# Patient Record
Sex: FEMALE | Race: BLACK OR AFRICAN AMERICAN | NOT HISPANIC OR LATINO | Employment: UNEMPLOYED | URBAN - METROPOLITAN AREA
[De-identification: names, ages, dates, MRNs, and addresses within clinical notes are randomized per-mention and may not be internally consistent; named-entity substitution may affect disease eponyms.]

---

## 2020-03-20 ENCOUNTER — NURSE TRIAGE (OUTPATIENT)
Dept: ADMINISTRATIVE | Facility: CLINIC | Age: 35
End: 2020-03-20

## 2020-03-20 NOTE — TELEPHONE ENCOUNTER
Pt c/o sinus pressure, nasal drainage, and SOB. Pt denies fever. Pt has history of MS. 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 Pt advised to be seen per [facility] care. Pt notified of nearest testing location. Pt also notified to contact employee health regarding symptoms and pt verbalizes understanding.     Reason for Disposition   Health Information question, no triage required and triager able to answer question    Additional Information   Negative: RN needs further essential information from caller in order to complete triage   Negative: Requesting regular office appointment   Negative: [1] Caller requesting NON-URGENT health information AND [2] PCP's office is the best resource    Protocols used: INFORMATION ONLY CALL-A-

## 2020-04-21 DIAGNOSIS — Z01.84 ANTIBODY RESPONSE EXAMINATION: ICD-10-CM

## 2020-05-21 DIAGNOSIS — Z01.84 ANTIBODY RESPONSE EXAMINATION: ICD-10-CM

## 2020-06-20 DIAGNOSIS — Z01.84 ANTIBODY RESPONSE EXAMINATION: ICD-10-CM

## 2020-07-20 DIAGNOSIS — Z01.84 ANTIBODY RESPONSE EXAMINATION: ICD-10-CM

## 2020-08-19 DIAGNOSIS — Z01.84 ANTIBODY RESPONSE EXAMINATION: ICD-10-CM

## 2020-08-21 DIAGNOSIS — R52 GENERALIZED BODY ACHES: ICD-10-CM

## 2020-08-21 DIAGNOSIS — R09.81 HEAD CONGESTION: ICD-10-CM

## 2020-08-21 DIAGNOSIS — R50.9 FEVER, UNSPECIFIED FEVER CAUSE: Primary | ICD-10-CM

## 2020-09-18 ENCOUNTER — OFFICE VISIT (OUTPATIENT)
Dept: URGENT CARE | Facility: CLINIC | Age: 35
End: 2020-09-18
Payer: COMMERCIAL

## 2020-09-18 VITALS
HEART RATE: 101 BPM | TEMPERATURE: 99 F | OXYGEN SATURATION: 99 % | WEIGHT: 219 LBS | BODY MASS INDEX: 43 KG/M2 | RESPIRATION RATE: 16 BRPM | DIASTOLIC BLOOD PRESSURE: 89 MMHG | SYSTOLIC BLOOD PRESSURE: 133 MMHG | HEIGHT: 60 IN

## 2020-09-18 DIAGNOSIS — Z01.84 ANTIBODY RESPONSE EXAMINATION: ICD-10-CM

## 2020-09-18 DIAGNOSIS — S46.912A LEFT SHOULDER STRAIN, INITIAL ENCOUNTER: Primary | ICD-10-CM

## 2020-09-18 PROCEDURE — 99204 PR OFFICE/OUTPT VISIT, NEW, LEVL IV, 45-59 MIN: ICD-10-PCS | Mod: S$GLB,,, | Performed by: NURSE PRACTITIONER

## 2020-09-18 PROCEDURE — 99204 OFFICE O/P NEW MOD 45 MIN: CPT | Mod: S$GLB,,, | Performed by: NURSE PRACTITIONER

## 2020-09-18 RX ORDER — PREGABALIN 75 MG/1
75 CAPSULE ORAL 2 TIMES DAILY
COMMUNITY
End: 2021-10-24

## 2020-09-18 RX ORDER — ONDANSETRON HYDROCHLORIDE 8 MG/1
8 TABLET, FILM COATED ORAL
COMMUNITY

## 2020-09-18 RX ORDER — TOPIRAMATE 100 MG/1
100 TABLET, FILM COATED ORAL 2 TIMES DAILY
COMMUNITY
End: 2021-10-24

## 2020-09-18 RX ORDER — GABAPENTIN 300 MG/1
300 CAPSULE ORAL 3 TIMES DAILY
COMMUNITY
End: 2021-10-24

## 2020-09-18 RX ORDER — PREDNISONE 20 MG/1
20 TABLET ORAL 2 TIMES DAILY
Qty: 10 TABLET | Refills: 0 | Status: SHIPPED | OUTPATIENT
Start: 2020-09-18 | End: 2020-09-23

## 2020-09-18 RX ORDER — TRAMADOL HYDROCHLORIDE 50 MG/1
50 TABLET ORAL EVERY 6 HOURS
COMMUNITY

## 2020-09-18 RX ORDER — RENAGEL 400 MG/1
12 TABLET ORAL
COMMUNITY

## 2020-09-18 RX ORDER — ALPRAZOLAM 0.5 MG/1
0.5 TABLET ORAL 3 TIMES DAILY PRN
COMMUNITY

## 2020-09-18 RX ORDER — LEVOTHYROXINE SODIUM 112 UG/1
112 TABLET ORAL
COMMUNITY

## 2020-09-18 NOTE — PATIENT INSTRUCTIONS
Self-Care for Strains and Sprains  Most minor strains and sprains can be treated with self-care. Recovering from a strain or sprain may take 6 to 8 weeks. Your self-care goal is to reduce pain and immobilize the injury to speed healing.     A sprain injures ligaments (tissue that connects bones to bones).        A strain injures muscles or tendons (tissue that connects muscles to bones).   Support the injured area  Wrapping the injured area provides support for short, necessary activities. Be careful not to wrap the area too tightly. This could cut off the blood supply.  · Support a wrist, elbow, or shoulder with a sling.  · Wrap an ankle or knee with an elastic bandage.  · Tape a finger or toe to the one next to it.  Use cold and heat  Cold reduces swelling. Both cold and heat reduce pain. Heat should not be used in the initial treatment of the injury. When using cold or heat, always place a towel between the pack and your skin.  · Apply ice or a cold pack 10 to 15 minutes every hour youre awake for the first 2 days.  · After the swelling goes down, use cold or heat to control pain. Dont use heat late in the day, since it can cause swelling when youre not active.  Rest and elevate  Rest and elevation help your injury heal faster.  · Raise the injured area above your heart level.  · Keep the injured area from moving.  · Limit the use of the joint or limb.  Use medicine  · Aspirin reduces pain and swelling. (Note: Dont give aspirin to a child 18 or younger unless prescribed by the doctor.)  · Aspirin substitutes, such as ibuprofen, can reduce pain. Some substitutes reduce swelling, too. Ask your pharmacist which substitutes you can use.  Call your doctor if:  · The injured joint wont move, or bones make a grating sound when they move.  · You cant put weight on the injured area, even after 24 hours.  · The injured body part is cold, blue, or numb.  · The joint or limb appears bent or crooked.  · Pain increases  or doesnt improve in 4 days.  · When pressing along the injured area, you notice a spot that is especially painful.   Date Last Reviewed: 9/29/2015 © 2000-2017 [employer]. [address] [place] [address], [place] [address]. All rights reserved. This information is not intended as a substitute for professional medical care. Always follow your healthcare professional's instructions.        Shoulder Pain with Uncertain Cause  Shoulder pain can have many causes. Pain often comes from the structures that surround the shoulder joint. These are the joint capsule, ligaments, tendons, muscles, and bursa. Pain can also come from cartilage in the joint. Cartilage can become worn out or injured. Its important to know whats causing your pain so the healthcare provider can use the correct treatment. But sometimes its difficult to find the exact cause of shoulder pain. You may need to see a specialist (orthopedist). You may also need special tests such as a CT scan or MRI. The provider may need to use special tools to look inside the joint (arthroscopy).  Shoulder pain can be treated with a sling or a device that keeps your shoulder from moving. You can take an anti-inflammatory medicine such as ibuprofen to ease pain. You may need to do special shoulder exercises. Follow up with a specialist if the pain is severe or doesnt go away after a few weeks.  Home care  Follow these tips when caring for yourself at home:  · If a sling was given to you, leave it in place for the time advised by your healthcare provider. If you arent sure how long to wear it, ask for advice. If the sling becomes loose, adjust it so that your forearm is level with the ground. Your shoulder should feel well supported.  · Put an ice pack on the injured area for 20 minutes every 1 to 2 hours the first day. You can make your own ice pack by putting ice cubes in a plastic bag. Wrap the bag in a thin towel. Continue with ice packs 3 to 4 times a day  for the next 2 days. Then use the pack as needed to ease pain and swelling.  · You may use acetaminophen or ibuprofen to control pain, unless another pain medicine was prescribed. If you have chronic liver or kidney disease, talk with your healthcare provider before using these medicines. Also talk with your provider if youve ever had a stomach ulcer or GI bleeding.  · Shoulder pain may seem worse at night, when there is less to distract you from the pain. If you sleep on your side, try to keep weight off your painful shoulder. Propping pillows behind you may stop you from rolling over onto that shoulder during sleep.   · Shoulder and elbow joints can become stiff if left in a sling for too long. You should start range of motion exercises about 7 to 10 days after the injury. Talk with your provider to find out what type of exercises to do and how soon to start.  · You can take the sling off to shower or bathe.  Follow-up care  Follow up with your healthcare provider if you dont start to get better in the next 5 days.  When to seek medical advice  Call your healthcare provider right away if any of these occur:  · Pain or swelling gets worse or continues for more than a few days  · Your hand or fingers become cold, blue, numb, or tingly  · Large amount of bruising on your shoulder or upper arm  · Difficulty moving your hand or fingers  · Weakness in your hand or fingers  · Your shoulder becomes stiff  · It feels like your shoulder is popping out  · You are less able to do your daily activities  Date Last Reviewed: 10/1/2016  © 7077-4433 [employer] [address] [place] [address], [place] [address]. All rights reserved. This information is not intended as a substitute for professional medical care. Always follow your healthcare professional's instructions.

## 2020-09-18 NOTE — PROGRESS NOTES
Subjective:       Patient ID: Sully Simmons is a 35 y.o. female.    Chief Complaint: Work Related Injury (Upper Back Pain )    Presents with left subscapular pain. 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 Felt a pulling sensation to left back on 9/4/20, went to an urgent care on 9/7/20, had xrays (negative), given naproxen and robaxin. States took naproxen with no relief. Has been working since then, feels like it is getting worse, or at least not improving    Back Pain  This is a new problem. The current episode started 1 to 4 weeks ago. The problem occurs 2 to 4 times per day. The problem has been gradually worsening since onset. The pain is present in the thoracic spine. The quality of the pain is described as aching and shooting. Pertinent negatives include no abdominal pain, bladder incontinence, bowel incontinence, dysuria or numbness.       Constitution: Negative for fatigue.   Gastrointestinal: Negative for abdominal pain and bowel incontinence.   Genitourinary: Negative for dysuria, urgency, bladder incontinence and hematuria.   Musculoskeletal: Positive for pain, trauma (heavy lifting and turning of [facility] patients) and back pain. Negative for muscle cramps and history of spine disorder.   Skin: Negative for rash.   Neurological: Negative for coordination disturbances, numbness and tingling.        Objective:      Physical Exam  Constitutional:       Appearance: Normal appearance. She is well-developed.   HENT:      Head: Normocephalic.      Right Ear: Hearing, tympanic membrane, ear canal and external ear normal.      Left Ear: Hearing, tympanic membrane, ear canal and external ear normal.      Nose: Nose normal.      Mouth/Throat:      Pharynx: Uvula midline.   Eyes:      General: Lids are normal.      Conjunctiva/sclera: Conjunctivae normal.      Pupils: Pupils are equal, round, and reactive to light.   Neck:      Musculoskeletal: Full passive range of motion  without pain, normal range of motion and neck supple.      Trachea: Trachea and phonation normal.   Cardiovascular:      Rate and Rhythm: Normal rate and regular rhythm.      Pulses: Normal pulses.      Heart sounds: Normal heart sounds.   Pulmonary:      Effort: Pulmonary effort is normal.      Breath sounds: Normal breath sounds.   Abdominal:      General: Bowel sounds are normal.      Palpations: Abdomen is soft.      Tenderness: There is no abdominal tenderness.   Musculoskeletal: Normal range of motion.      Thoracic back: She exhibits tenderness, pain and spasm.        Back:         Arms:    Skin:     General: Skin is warm and dry.      Capillary Refill: Capillary refill takes less than 2 seconds.   Neurological:      Mental Status: She is alert and oriented to person, place, and time.      GCS: GCS eye subscore is 4. GCS verbal subscore is 5. GCS motor subscore is 6.   Psychiatric:         Speech: Speech normal.         Behavior: Behavior normal. Behavior is cooperative.         Thought Content: Thought content normal.         Judgment: Judgment normal.         Assessment:       1. Left shoulder strain, initial encounter        Plan:     recommended restrictions at work, to take robaxin that she has at home along with prednisone. Will follow up in one week for recheck, will decide then if needs to go to ortho    Medications Ordered This Encounter   Medications    predniSONE (DELTASONE) 20 MG tablet     Sig: Take 1 tablet (20 mg total) by mouth 2 (two) times daily. for 5 days     Dispense:  10 tablet     Refill:  0            Follow up in 1 week (on 9/25/2020).

## 2020-09-22 ENCOUNTER — TELEPHONE (OUTPATIENT)
Dept: URGENT CARE | Facility: CLINIC | Age: 35
End: 2020-09-22

## 2020-09-25 ENCOUNTER — OCCUPATIONAL HEALTH (OUTPATIENT)
Dept: URGENT CARE | Facility: CLINIC | Age: 35
End: 2020-09-25
Payer: COMMERCIAL

## 2020-09-25 VITALS
OXYGEN SATURATION: 98 % | HEART RATE: 86 BPM | DIASTOLIC BLOOD PRESSURE: 90 MMHG | WEIGHT: 259 LBS | TEMPERATURE: 99 F | SYSTOLIC BLOOD PRESSURE: 124 MMHG | RESPIRATION RATE: 12 BRPM | BODY MASS INDEX: 50.58 KG/M2

## 2020-09-25 DIAGNOSIS — S46.912D LEFT SHOULDER STRAIN, SUBSEQUENT ENCOUNTER: Primary | ICD-10-CM

## 2020-09-25 PROCEDURE — 99214 PR OFFICE/OUTPT VISIT, EST, LEVL IV, 30-39 MIN: ICD-10-PCS | Mod: S$GLB,,, | Performed by: NURSE PRACTITIONER

## 2020-09-25 PROCEDURE — 99214 OFFICE O/P EST MOD 30 MIN: CPT | Mod: S$GLB,,, | Performed by: NURSE PRACTITIONER

## 2020-09-25 RX ORDER — HYDROCODONE BITARTRATE AND ACETAMINOPHEN 5; 325 MG/1; MG/1
1 TABLET ORAL EVERY 6 HOURS PRN
Qty: 12 TABLET | Refills: 0 | Status: SHIPPED | OUTPATIENT
Start: 2020-09-25 | End: 2021-10-24

## 2020-09-25 RX ORDER — LIDOCAINE 50 MG/G
1 PATCH TOPICAL DAILY
Qty: 30 PATCH | Refills: 0 | Status: SHIPPED | OUTPATIENT
Start: 2020-09-25

## 2020-09-25 NOTE — PROGRESS NOTES
Subjective:       Patient ID: Sully Simmons is a 35 y.o. female.    Vitals:  weight is 117.5 kg (259 lb). Her temperature is 98.7 °F (37.1 °C). Her blood pressure is 124/90 (abnormal) and her pulse is 86. Her respiration is 12 and oxygen saturation is 98%.     Chief Complaint: Work Related Injury    W/C DOI: 9/18/2020 Left shoulder strain   Does not feel she is improving. States she has taken medications as prescribed and following restrictions, states she does not notice much difference in pain from original visit. Pain occurs all the time, with standing, sitting and moving    Shoulder Pain   This is a new problem. Episode onset: 9/18/2020  The problem occurs constantly. The problem has been gradually worsening. The pain is at a severity of 5/10. Pertinent negatives include no fever or headaches. Associated symptoms comments: All day stiffness, left shoulder pain that radiates upward   ( throbbing and achy )   . Treatments tried: tramodol and steriods  The treatment provided no relief.       Constitution: Negative for appetite change, chills and fever.   HENT: Negative for ear pain, congestion and sore throat.    Neck: Negative for painful lymph nodes.   Eyes: Negative for eye discharge and eye redness.   Respiratory: Negative for cough.    Gastrointestinal: Negative for vomiting and diarrhea.   Genitourinary: Negative for dysuria.   Musculoskeletal: Positive for pain, joint pain and muscle ache.   Skin: Negative for rash.   Neurological: Negative for headaches and seizures.   Hematologic/Lymphatic: Negative for swollen lymph nodes.       Objective:      Physical Exam   Constitutional: She is oriented to person, place, and time. She appears well-developed.   HENT:   Head: Normocephalic and atraumatic.   Nose: Nose normal.   Mouth/Throat: Oropharynx is clear and moist.   Eyes: Pupils are equal, round, and reactive to light. Conjunctivae and EOM are normal.   Neck: Normal range of motion. Neck supple.    Cardiovascular: Normal rate, regular rhythm and normal heart sounds.   Pulmonary/Chest: Effort normal and breath sounds normal.   Abdominal: Soft.   Musculoskeletal:      Left shoulder: She exhibits decreased range of motion, tenderness, pain and spasm.   Neurological: She is alert and oriented to person, place, and time.   Skin: Skin is warm and dry. Capillary refill takes less than 2 seconds. Psychiatric: Her behavior is normal.         Assessment:       1. Left shoulder strain, subsequent encounter        Plan:     will refer patient to ortho as she continues to have deep posterior shoulder pain with rest and with movement. Will try lidoderm patches, norco for pain with restrictions at work    Left shoulder strain, subsequent encounter    Other orders  -     lidocaine (LIDODERM) 5 %; Place 1 patch onto the skin once daily. Remove & Discard patch within 12 hours or as directed by MD  Dispense: 30 patch; Refill: 0  -     HYDROcodone-acetaminophen (NORCO) 5-325 mg per tablet; Take 1 tablet by mouth every 6 (six) hours as needed for Pain.  Dispense: 12 tablet; Refill: 0

## 2020-09-28 ENCOUNTER — TELEPHONE (OUTPATIENT)
Dept: URGENT CARE | Facility: CLINIC | Age: 35
End: 2020-09-28

## 2020-09-28 NOTE — TELEPHONE ENCOUNTER
Pt called to see if we could edit her work status and put her out of work bc her shoulder is not doing any better. Informed that since there's no obvious fracture, we cannot do that. She will keep her current restrictions and f/u with ortho as referred. They will update restrictions as needed. Pt provided verbal understanding.

## 2020-10-18 DIAGNOSIS — Z01.84 ANTIBODY RESPONSE EXAMINATION: ICD-10-CM

## 2020-11-13 ENCOUNTER — OFFICE VISIT (OUTPATIENT)
Dept: URGENT CARE | Facility: CLINIC | Age: 35
End: 2020-11-13
Payer: COMMERCIAL

## 2020-11-13 VITALS
TEMPERATURE: 98 F | HEART RATE: 95 BPM | BODY MASS INDEX: 41.91 KG/M2 | WEIGHT: 222 LBS | OXYGEN SATURATION: 97 % | HEIGHT: 61 IN | DIASTOLIC BLOOD PRESSURE: 99 MMHG | SYSTOLIC BLOOD PRESSURE: 138 MMHG

## 2020-11-13 DIAGNOSIS — M25.512 ACUTE PAIN OF LEFT SHOULDER: Primary | ICD-10-CM

## 2020-11-13 PROCEDURE — 99214 OFFICE O/P EST MOD 30 MIN: CPT | Mod: S$GLB,,, | Performed by: NURSE PRACTITIONER

## 2020-11-13 PROCEDURE — 99214 PR OFFICE/OUTPT VISIT, EST, LEVL IV, 30-39 MIN: ICD-10-PCS | Mod: S$GLB,,, | Performed by: NURSE PRACTITIONER

## 2020-11-13 RX ORDER — HYDROCODONE BITARTRATE AND ACETAMINOPHEN 5; 325 MG/1; MG/1
1 TABLET ORAL EVERY 8 HOURS PRN
Qty: 6 TABLET | Refills: 0 | Status: SHIPPED | OUTPATIENT
Start: 2020-11-13 | End: 2021-10-24

## 2020-11-13 NOTE — PROGRESS NOTES
"Subjective:       Patient ID: Sully Simmons is a 35 y.o. female.    Vitals:  height is 5' 1" (1.549 m) and weight is 100.7 kg (222 lb). Her oral temperature is 98.1 °F (36.7 °C). Her blood pressure is 138/99 (abnormal) and her pulse is 95. Her oxygen saturation is 97%.     Chief Complaint: Work Related Injury    Patient complains of pain in her left shoulder. Says she has an appt with ortho surgeon Monday. 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 Says it is very painful. Pain is underneath her shoulder blade, & now it is extending up to her neck. Reports she cant take NSAID or Naproxen. Said she has also tried Tylenol/Tramadol but no relief.  Patient requesting narcotics and sleeping medication.       Constitution: Negative for chills, fatigue and fever.   HENT: Negative for congestion and sore throat.    Neck: Negative for painful lymph nodes.   Cardiovascular: Negative for chest pain and leg swelling.   Eyes: Negative for double vision and blurred vision.   Respiratory: Negative for cough and shortness of breath.    Gastrointestinal: Negative for abdominal pain, nausea, vomiting and diarrhea.   Genitourinary: Negative for dysuria, frequency, urgency and history of kidney stones.   Musculoskeletal: Negative for joint pain, joint swelling, muscle cramps and muscle ache.        Left shoulder pain   Skin: Negative for color change, pale, rash and bruising.   Allergic/Immunologic: Negative for seasonal allergies.   Neurological: Negative for dizziness, history of vertigo, light-headedness, passing out and headaches.   Hematologic/Lymphatic: Negative for swollen lymph nodes.   Psychiatric/Behavioral: Negative for nervous/anxious, sleep disturbance and depression. The patient is not nervous/anxious.        Objective:      Physical Exam   Constitutional: She is oriented to person, place, and time. She appears well-developed. She is cooperative.  " Non-toxic appearance. She does not appear ill. No distress.   HENT:   Head: Normocephalic and atraumatic.   Ears:   Right Ear: Hearing, tympanic membrane, external ear and ear canal normal.   Left Ear: Hearing, tympanic membrane, external ear and ear canal normal.   Nose: Nose normal. No mucosal edema, rhinorrhea or nasal deformity. No epistaxis. Right sinus exhibits no maxillary sinus tenderness and no frontal sinus tenderness. Left sinus exhibits no maxillary sinus tenderness and no frontal sinus tenderness.   Mouth/Throat: Uvula is midline, oropharynx is clear and moist and mucous membranes are normal. No trismus in the jaw. Normal dentition. No uvula swelling. No posterior oropharyngeal erythema.   Eyes: Conjunctivae and lids are normal. Right eye exhibits no discharge. Left eye exhibits no discharge. No scleral icterus.   Neck: Trachea normal, normal range of motion, full passive range of motion without pain and phonation normal. Neck supple.   Cardiovascular: Normal rate, regular rhythm, normal heart sounds and normal pulses.   Pulmonary/Chest: Effort normal and breath sounds normal. No respiratory distress.   Abdominal: Soft. Normal appearance and bowel sounds are normal. She exhibits no distension, no pulsatile midline mass and no mass. There is no abdominal tenderness.   Musculoskeletal: Normal range of motion.         General: No deformity.      Left shoulder: She exhibits tenderness, pain and decreased strength. She exhibits normal range of motion, no bony tenderness, no swelling, no effusion, no crepitus, no deformity, no laceration, no spasm and normal pulse.   Neurological: She is alert and oriented to person, place, and time. She exhibits normal muscle tone. Coordination normal. GCS eye subscore is 4. GCS verbal subscore is 5. GCS motor subscore is 6.   Skin: Skin is warm, dry, intact, not diaphoretic and not pale. Psychiatric: Her speech is normal and behavior is normal. Judgment and thought content  normal.   Nursing note and vitals reviewed.        Assessment:       1. Acute pain of left shoulder        Plan:       Advised to follow up with orthopedic as scheduled on Monday.     Acute pain of left shoulder    Other orders  -     HYDROcodone-acetaminophen (NORCO) 5-325 mg per tablet; Take 1 tablet by mouth every 8 (eight) hours as needed for Pain.  Dispense: 6 tablet; Refill: 0

## 2020-11-13 NOTE — PATIENT INSTRUCTIONS
Shoulder Pain with Uncertain Cause  Shoulder pain can have many causes. Pain often comes from the structures that surround the shoulder joint. These are the joint capsule, ligaments, tendons, muscles, and bursa. Pain can also come from cartilage in the joint. Cartilage can become worn out or injured. Its important to know whats causing your pain so the healthcare provider can use the correct treatment. But sometimes its difficult to find the exact cause of shoulder pain. You may need to see a specialist (orthopedist). You may also need special tests such as a CT scan or MRI. The provider may need to use special tools to look inside the joint (arthroscopy).  Shoulder pain can be treated with a sling or a device that keeps your shoulder from moving. You can take an anti-inflammatory medicine such as ibuprofen to ease pain. You may need to do special shoulder exercises. Follow up with a specialist if the pain is severe or doesnt go away after a few weeks.  Home care  Follow these tips when caring for yourself at home:  · If a sling was given to you, leave it in place for the time advised by your healthcare provider. If you arent sure how long to wear it, ask for advice. If the sling becomes loose, adjust it so that your forearm is level with the ground. Your shoulder should feel well supported.  · Put an ice pack on the injured area for 20 minutes every 1 to 2 hours the first day. You can make your own ice pack by putting ice cubes in a plastic bag. Wrap the bag in a thin towel. Continue with ice packs 3 to 4 times a day for the next 2 days. Then use the pack as needed to ease pain and swelling.  · You may use acetaminophen or ibuprofen to control pain, unless another pain medicine was prescribed. If you have chronic liver or kidney disease, talk with your healthcare provider before using these medicines. Also talk with your provider if youve ever had a stomach ulcer or GI bleeding.  · Shoulder pain may seem  worse at night, when there is less to distract you from the pain. If you sleep on your side, try to keep weight off your painful shoulder. Propping pillows behind you may stop you from rolling over onto that shoulder during sleep.   · Shoulder and elbow joints can become stiff if left in a sling for too long. You should start range of motion exercises about 7 to 10 days after the injury. Talk with your provider to find out what type of exercises to do and how soon to start.  · You can take the sling off to shower or bathe.  Follow-up care  Follow up with your healthcare provider if you dont start to get better in the next 5 days.  When to seek medical advice  Call your healthcare provider right away if any of these occur:  · Pain or swelling gets worse or continues for more than a few days  · Your hand or fingers become cold, blue, numb, or tingly  · Large amount of bruising on your shoulder or upper arm  · Difficulty moving your hand or fingers  · Weakness in your hand or fingers  · Your shoulder becomes stiff  · It feels like your shoulder is popping out  · You are less able to do your daily activities  Date Last Reviewed: 10/1/2016  © 3539-3808 [employer]. [address] [place] [address], [place] [address]. All rights reserved. This information is not intended as a substitute for professional medical care. Always follow your healthcare professional's instructions.

## 2020-11-17 DIAGNOSIS — Z01.84 ANTIBODY RESPONSE EXAMINATION: ICD-10-CM

## 2021-02-10 ENCOUNTER — HOSPITAL ENCOUNTER (EMERGENCY)
Facility: HOSPITAL | Age: 36
Discharge: HOME | End: 2021-02-10
Attending: EMERGENCY MEDICINE
Payer: MEDICAID

## 2021-02-10 VITALS
WEIGHT: 225 LBS | HEIGHT: 57 IN | HEART RATE: 91 BPM | OXYGEN SATURATION: 100 % | TEMPERATURE: 98 F | RESPIRATION RATE: 18 BRPM | DIASTOLIC BLOOD PRESSURE: 57 MMHG | SYSTOLIC BLOOD PRESSURE: 101 MMHG | BODY MASS INDEX: 48.54 KG/M2

## 2021-02-10 DIAGNOSIS — N39.0 URINARY TRACT INFECTION WITHOUT HEMATURIA, SITE UNSPECIFIED: ICD-10-CM

## 2021-02-10 DIAGNOSIS — R00.2 PALPITATIONS: Primary | ICD-10-CM

## 2021-02-10 LAB
ALBUMIN SERPL BCP-MCNC: 3.4 G/DL (ref 3.5–5.2)
ALP SERPL-CCNC: 88 U/L (ref 55–135)
ALT SERPL W/O P-5'-P-CCNC: 15 U/L (ref 10–44)
ANION GAP SERPL CALC-SCNC: 10 MMOL/L (ref 8–16)
AST SERPL-CCNC: 14 U/L (ref 10–40)
BACTERIA #/AREA URNS HPF: ABNORMAL /HPF
BASOPHILS # BLD AUTO: 0.06 K/UL (ref 0–0.2)
BASOPHILS NFR BLD: 0.5 % (ref 0–1.9)
BILIRUB SERPL-MCNC: 0.2 MG/DL (ref 0.1–1)
BILIRUB UR QL STRIP: NEGATIVE
BUN SERPL-MCNC: 7 MG/DL (ref 6–20)
CALCIUM SERPL-MCNC: 9.7 MG/DL (ref 8.7–10.5)
CHLORIDE SERPL-SCNC: 104 MMOL/L (ref 95–110)
CLARITY UR: ABNORMAL
CO2 SERPL-SCNC: 24 MMOL/L (ref 23–29)
COLOR UR: YELLOW
CREAT SERPL-MCNC: 0.7 MG/DL (ref 0.5–1.4)
DIFFERENTIAL METHOD: ABNORMAL
EOSINOPHIL # BLD AUTO: 0.2 K/UL (ref 0–0.5)
EOSINOPHIL NFR BLD: 1.6 % (ref 0–8)
ERYTHROCYTE [DISTWIDTH] IN BLOOD BY AUTOMATED COUNT: 13.1 % (ref 11.5–14.5)
EST. GFR  (AFRICAN AMERICAN): >60 ML/MIN/1.73 M^2
EST. GFR  (NON AFRICAN AMERICAN): >60 ML/MIN/1.73 M^2
GLUCOSE SERPL-MCNC: 80 MG/DL (ref 70–110)
GLUCOSE UR QL STRIP: NEGATIVE
HCT VFR BLD AUTO: 37.2 % (ref 37–48.5)
HGB BLD-MCNC: 11.7 G/DL (ref 12–16)
HGB UR QL STRIP: NEGATIVE
IMM GRANULOCYTES # BLD AUTO: 0.04 K/UL (ref 0–0.04)
IMM GRANULOCYTES NFR BLD AUTO: 0.3 % (ref 0–0.5)
KETONES UR QL STRIP: NEGATIVE
LEUKOCYTE ESTERASE UR QL STRIP: ABNORMAL
LYMPHOCYTES # BLD AUTO: 2.3 K/UL (ref 1–4.8)
LYMPHOCYTES NFR BLD: 17.6 % (ref 18–48)
MAGNESIUM SERPL-MCNC: 1.7 MG/DL (ref 1.6–2.6)
MCH RBC QN AUTO: 27.3 PG (ref 27–31)
MCHC RBC AUTO-ENTMCNC: 31.5 G/DL (ref 32–36)
MCV RBC AUTO: 87 FL (ref 82–98)
MICROSCOPIC COMMENT: ABNORMAL
MONOCYTES # BLD AUTO: 1.1 K/UL (ref 0.3–1)
MONOCYTES NFR BLD: 8.2 % (ref 4–15)
NEUTROPHILS # BLD AUTO: 9.2 K/UL (ref 1.8–7.7)
NEUTROPHILS NFR BLD: 71.8 % (ref 38–73)
NITRITE UR QL STRIP: NEGATIVE
NRBC BLD-RTO: 0 /100 WBC
PH UR STRIP: 7 [PH] (ref 5–8)
PLATELET # BLD AUTO: 305 K/UL (ref 150–350)
PMV BLD AUTO: 9.9 FL (ref 9.2–12.9)
POCT GLUCOSE: 81 MG/DL (ref 70–110)
POTASSIUM SERPL-SCNC: 3.7 MMOL/L (ref 3.5–5.1)
PROT SERPL-MCNC: 7.5 G/DL (ref 6–8.4)
PROT UR QL STRIP: NEGATIVE
RBC # BLD AUTO: 4.28 M/UL (ref 4–5.4)
SODIUM SERPL-SCNC: 138 MMOL/L (ref 136–145)
SP GR UR STRIP: 1.01 (ref 1–1.03)
SQUAMOUS #/AREA URNS HPF: 15 /HPF
TSH SERPL DL<=0.005 MIU/L-ACNC: 3.4 UIU/ML (ref 0.4–4)
URN SPEC COLLECT METH UR: ABNORMAL
UROBILINOGEN UR STRIP-ACNC: NEGATIVE EU/DL
WBC # BLD AUTO: 12.75 K/UL (ref 3.9–12.7)
WBC #/AREA URNS HPF: 25 /HPF (ref 0–5)

## 2021-02-10 PROCEDURE — 80053 COMPREHEN METABOLIC PANEL: CPT

## 2021-02-10 PROCEDURE — 93005 ELECTROCARDIOGRAM TRACING: CPT

## 2021-02-10 PROCEDURE — 93010 ELECTROCARDIOGRAM REPORT: CPT | Mod: ,,, | Performed by: INTERNAL MEDICINE

## 2021-02-10 PROCEDURE — 99284 EMERGENCY DEPT VISIT MOD MDM: CPT | Mod: 25

## 2021-02-10 PROCEDURE — 81000 URINALYSIS NONAUTO W/SCOPE: CPT

## 2021-02-10 PROCEDURE — 84443 ASSAY THYROID STIM HORMONE: CPT

## 2021-02-10 PROCEDURE — 85025 COMPLETE CBC W/AUTO DIFF WBC: CPT

## 2021-02-10 PROCEDURE — 36415 COLL VENOUS BLD VENIPUNCTURE: CPT

## 2021-02-10 PROCEDURE — 83735 ASSAY OF MAGNESIUM: CPT

## 2021-02-10 PROCEDURE — 82962 GLUCOSE BLOOD TEST: CPT

## 2021-02-10 PROCEDURE — 87086 URINE CULTURE/COLONY COUNT: CPT

## 2021-02-10 PROCEDURE — 93010 EKG 12-LEAD: ICD-10-PCS | Mod: ,,, | Performed by: INTERNAL MEDICINE

## 2021-02-10 RX ORDER — NITROFURANTOIN 25; 75 MG/1; MG/1
100 CAPSULE ORAL 2 TIMES DAILY
Qty: 10 CAPSULE | Refills: 0 | Status: SHIPPED | OUTPATIENT
Start: 2021-02-10 | End: 2021-02-15

## 2021-02-11 LAB
BACTERIA UR CULT: NORMAL
BACTERIA UR CULT: NORMAL

## 2021-10-24 ENCOUNTER — HOSPITAL ENCOUNTER (INPATIENT)
Facility: HOSPITAL | Age: 36
LOS: 1 days | Discharge: HOME | DRG: 177 | End: 2021-10-25
Attending: EMERGENCY MEDICINE | Admitting: INTERNAL MEDICINE
Payer: MEDICAID

## 2021-10-24 DIAGNOSIS — U07.1 PNEUMONIA DUE TO COVID-19 VIRUS: Primary | ICD-10-CM

## 2021-10-24 DIAGNOSIS — J96.01 ACUTE HYPOXEMIC RESPIRATORY FAILURE: ICD-10-CM

## 2021-10-24 DIAGNOSIS — Z20.822 SUSPECTED COVID-19 VIRUS INFECTION: ICD-10-CM

## 2021-10-24 DIAGNOSIS — J12.82 PNEUMONIA DUE TO COVID-19 VIRUS: Primary | ICD-10-CM

## 2021-10-24 DIAGNOSIS — R06.02 SOB (SHORTNESS OF BREATH): ICD-10-CM

## 2021-10-24 PROBLEM — G35 MS (MULTIPLE SCLEROSIS): Status: ACTIVE | Noted: 2021-10-24

## 2021-10-24 PROBLEM — D50.9 HYPOCHROMIC MICROCYTIC ANEMIA: Status: ACTIVE | Noted: 2021-10-24

## 2021-10-24 PROBLEM — E66.01 SEVERE OBESITY (BMI >= 40): Status: ACTIVE | Noted: 2021-10-24

## 2021-10-24 PROBLEM — E06.3 HASHIMOTO'S THYROIDITIS: Status: ACTIVE | Noted: 2021-10-24

## 2021-10-24 LAB
ALBUMIN SERPL BCP-MCNC: 3.6 G/DL (ref 3.5–5.2)
ALP SERPL-CCNC: 83 U/L (ref 55–135)
ALT SERPL W/O P-5'-P-CCNC: 20 U/L (ref 10–44)
ANION GAP SERPL CALC-SCNC: 9 MMOL/L (ref 8–16)
AST SERPL-CCNC: 33 U/L (ref 10–40)
BASOPHILS # BLD AUTO: 0.02 K/UL (ref 0–0.2)
BASOPHILS NFR BLD: 0.4 % (ref 0–1.9)
BILIRUB SERPL-MCNC: 0.6 MG/DL (ref 0.1–1)
BNP SERPL-MCNC: 29 PG/ML (ref 0–99)
BUN SERPL-MCNC: 5 MG/DL (ref 6–20)
CALCIUM SERPL-MCNC: 8.3 MG/DL (ref 8.7–10.5)
CHLORIDE SERPL-SCNC: 99 MMOL/L (ref 95–110)
CK SERPL-CCNC: 118 U/L (ref 20–180)
CO2 SERPL-SCNC: 28 MMOL/L (ref 23–29)
CREAT SERPL-MCNC: 0.8 MG/DL (ref 0.5–1.4)
CRP SERPL-MCNC: 5.46 MG/DL
D DIMER PPP IA.FEU-MCNC: 0.63 UG/ML FEU
DIFFERENTIAL METHOD: ABNORMAL
EOSINOPHIL # BLD AUTO: 0.1 K/UL (ref 0–0.5)
EOSINOPHIL NFR BLD: 1.7 % (ref 0–8)
ERYTHROCYTE [DISTWIDTH] IN BLOOD BY AUTOMATED COUNT: 13.6 % (ref 11.5–14.5)
EST. GFR  (AFRICAN AMERICAN): >60 ML/MIN/1.73 M^2
EST. GFR  (NON AFRICAN AMERICAN): >60 ML/MIN/1.73 M^2
FERRITIN SERPL-MCNC: 53 NG/ML (ref 20–300)
GLUCOSE SERPL-MCNC: 160 MG/DL (ref 70–110)
GLUCOSE SERPL-MCNC: 96 MG/DL (ref 70–110)
HCT VFR BLD AUTO: 37.4 % (ref 37–48.5)
HGB BLD-MCNC: 11.6 G/DL (ref 12–16)
IMM GRANULOCYTES # BLD AUTO: 0.01 K/UL (ref 0–0.04)
IMM GRANULOCYTES NFR BLD AUTO: 0.2 % (ref 0–0.5)
LACTATE SERPL-SCNC: 0.6 MMOL/L (ref 0.5–1.9)
LDH SERPL L TO P-CCNC: 267 U/L (ref 110–260)
LYMPHOCYTES # BLD AUTO: 1.3 K/UL (ref 1–4.8)
LYMPHOCYTES NFR BLD: 24.2 % (ref 18–48)
MCH RBC QN AUTO: 25.2 PG (ref 27–31)
MCHC RBC AUTO-ENTMCNC: 31 G/DL (ref 32–36)
MCV RBC AUTO: 81 FL (ref 82–98)
MONOCYTES # BLD AUTO: 0.4 K/UL (ref 0.3–1)
MONOCYTES NFR BLD: 7.8 % (ref 4–15)
NEUTROPHILS # BLD AUTO: 3.5 K/UL (ref 1.8–7.7)
NEUTROPHILS NFR BLD: 65.7 % (ref 38–73)
NRBC BLD-RTO: 0 /100 WBC
PLATELET # BLD AUTO: 254 K/UL (ref 150–450)
PMV BLD AUTO: 10.8 FL (ref 9.2–12.9)
POTASSIUM SERPL-SCNC: 3.9 MMOL/L (ref 3.5–5.1)
PROCALCITONIN SERPL IA-MCNC: <0.05 NG/ML (ref 0–0.5)
PROT SERPL-MCNC: 7.4 G/DL (ref 6–8.4)
RBC # BLD AUTO: 4.61 M/UL (ref 4–5.4)
SARS-COV-2 RDRP RESP QL NAA+PROBE: POSITIVE
SODIUM SERPL-SCNC: 136 MMOL/L (ref 136–145)
TROPONIN I SERPL DL<=0.01 NG/ML-MCNC: <0.03 NG/ML
WBC # BLD AUTO: 5.28 K/UL (ref 3.9–12.7)

## 2021-10-24 PROCEDURE — 21400001 HC TELEMETRY ROOM

## 2021-10-24 PROCEDURE — 93010 EKG 12-LEAD: ICD-10-PCS | Mod: ,,, | Performed by: INTERNAL MEDICINE

## 2021-10-24 PROCEDURE — U0002 COVID-19 LAB TEST NON-CDC: HCPCS | Performed by: EMERGENCY MEDICINE

## 2021-10-24 PROCEDURE — 99900035 HC TECH TIME PER 15 MIN (STAT)

## 2021-10-24 PROCEDURE — 93005 ELECTROCARDIOGRAM TRACING: CPT | Performed by: INTERNAL MEDICINE

## 2021-10-24 PROCEDURE — 99291 CRITICAL CARE FIRST HOUR: CPT

## 2021-10-24 PROCEDURE — 63600175 PHARM REV CODE 636 W HCPCS: Performed by: NURSE PRACTITIONER

## 2021-10-24 PROCEDURE — 82550 ASSAY OF CK (CPK): CPT | Performed by: EMERGENCY MEDICINE

## 2021-10-24 PROCEDURE — 25000003 PHARM REV CODE 250: Performed by: NURSE PRACTITIONER

## 2021-10-24 PROCEDURE — 85379 FIBRIN DEGRADATION QUANT: CPT | Performed by: NURSE PRACTITIONER

## 2021-10-24 PROCEDURE — 80053 COMPREHEN METABOLIC PANEL: CPT | Performed by: EMERGENCY MEDICINE

## 2021-10-24 PROCEDURE — 83605 ASSAY OF LACTIC ACID: CPT | Performed by: EMERGENCY MEDICINE

## 2021-10-24 PROCEDURE — 82728 ASSAY OF FERRITIN: CPT | Performed by: EMERGENCY MEDICINE

## 2021-10-24 PROCEDURE — 85025 COMPLETE CBC W/AUTO DIFF WBC: CPT | Performed by: EMERGENCY MEDICINE

## 2021-10-24 PROCEDURE — 83880 ASSAY OF NATRIURETIC PEPTIDE: CPT | Performed by: EMERGENCY MEDICINE

## 2021-10-24 PROCEDURE — C9399 UNCLASSIFIED DRUGS OR BIOLOG: HCPCS | Performed by: NURSE PRACTITIONER

## 2021-10-24 PROCEDURE — 84145 PROCALCITONIN (PCT): CPT | Performed by: EMERGENCY MEDICINE

## 2021-10-24 PROCEDURE — 87040 BLOOD CULTURE FOR BACTERIA: CPT | Mod: 59 | Performed by: EMERGENCY MEDICINE

## 2021-10-24 PROCEDURE — 84484 ASSAY OF TROPONIN QUANT: CPT | Performed by: EMERGENCY MEDICINE

## 2021-10-24 PROCEDURE — 93010 ELECTROCARDIOGRAM REPORT: CPT | Mod: ,,, | Performed by: INTERNAL MEDICINE

## 2021-10-24 PROCEDURE — 96374 THER/PROPH/DIAG INJ IV PUSH: CPT

## 2021-10-24 PROCEDURE — 86140 C-REACTIVE PROTEIN: CPT | Performed by: EMERGENCY MEDICINE

## 2021-10-24 PROCEDURE — 36415 COLL VENOUS BLD VENIPUNCTURE: CPT | Performed by: NURSE PRACTITIONER

## 2021-10-24 PROCEDURE — 83615 LACTATE (LD) (LDH) ENZYME: CPT | Performed by: EMERGENCY MEDICINE

## 2021-10-24 RX ORDER — MAGNESIUM SULFATE HEPTAHYDRATE 40 MG/ML
2 INJECTION, SOLUTION INTRAVENOUS
Status: DISCONTINUED | OUTPATIENT
Start: 2021-10-24 | End: 2021-10-25 | Stop reason: HOSPADM

## 2021-10-24 RX ORDER — POTASSIUM CHLORIDE 7.45 MG/ML
40 INJECTION INTRAVENOUS
Status: DISCONTINUED | OUTPATIENT
Start: 2021-10-24 | End: 2021-10-25 | Stop reason: HOSPADM

## 2021-10-24 RX ORDER — POTASSIUM CHLORIDE 20 MEQ/1
20 TABLET, EXTENDED RELEASE ORAL
Status: DISCONTINUED | OUTPATIENT
Start: 2021-10-24 | End: 2021-10-25 | Stop reason: HOSPADM

## 2021-10-24 RX ORDER — CODEINE PHOSPHATE AND GUAIFENESIN 10; 100 MG/5ML; MG/5ML
5 SOLUTION ORAL
Status: COMPLETED | OUTPATIENT
Start: 2021-10-24 | End: 2021-10-24

## 2021-10-24 RX ORDER — BENZONATATE 100 MG/1
100 CAPSULE ORAL ONCE
Status: DISCONTINUED | OUTPATIENT
Start: 2021-10-24 | End: 2021-10-24

## 2021-10-24 RX ORDER — LEVOTHYROXINE SODIUM 112 UG/1
112 TABLET ORAL
Status: DISCONTINUED | OUTPATIENT
Start: 2021-10-25 | End: 2021-10-25 | Stop reason: HOSPADM

## 2021-10-24 RX ORDER — TALC
6 POWDER (GRAM) TOPICAL NIGHTLY
Status: DISCONTINUED | OUTPATIENT
Start: 2021-10-24 | End: 2021-10-25 | Stop reason: HOSPADM

## 2021-10-24 RX ORDER — ONDANSETRON 2 MG/ML
4 INJECTION INTRAMUSCULAR; INTRAVENOUS EVERY 8 HOURS PRN
Status: DISCONTINUED | OUTPATIENT
Start: 2021-10-24 | End: 2021-10-25 | Stop reason: HOSPADM

## 2021-10-24 RX ORDER — ENOXAPARIN SODIUM 100 MG/ML
40 INJECTION SUBCUTANEOUS EVERY 12 HOURS
Status: DISCONTINUED | OUTPATIENT
Start: 2021-10-24 | End: 2021-10-25 | Stop reason: HOSPADM

## 2021-10-24 RX ORDER — POLYETHYLENE GLYCOL 3350 17 G/17G
17 POWDER, FOR SOLUTION ORAL 2 TIMES DAILY PRN
Status: DISCONTINUED | OUTPATIENT
Start: 2021-10-24 | End: 2021-10-25 | Stop reason: HOSPADM

## 2021-10-24 RX ORDER — DEXAMETHASONE SODIUM PHOSPHATE 4 MG/ML
6 INJECTION, SOLUTION INTRA-ARTICULAR; INTRALESIONAL; INTRAMUSCULAR; INTRAVENOUS; SOFT TISSUE ONCE
Status: COMPLETED | OUTPATIENT
Start: 2021-10-24 | End: 2021-10-24

## 2021-10-24 RX ORDER — MAGNESIUM SULFATE HEPTAHYDRATE 40 MG/ML
4 INJECTION, SOLUTION INTRAVENOUS
Status: DISCONTINUED | OUTPATIENT
Start: 2021-10-24 | End: 2021-10-25 | Stop reason: HOSPADM

## 2021-10-24 RX ORDER — NALOXONE HCL 0.4 MG/ML
0.02 VIAL (ML) INJECTION
Status: DISCONTINUED | OUTPATIENT
Start: 2021-10-24 | End: 2021-10-25 | Stop reason: HOSPADM

## 2021-10-24 RX ORDER — MAGNESIUM SULFATE 1 G/100ML
1 INJECTION INTRAVENOUS
Status: DISCONTINUED | OUTPATIENT
Start: 2021-10-24 | End: 2021-10-25 | Stop reason: HOSPADM

## 2021-10-24 RX ORDER — ACETAMINOPHEN 325 MG/1
650 TABLET ORAL EVERY 4 HOURS PRN
Status: DISCONTINUED | OUTPATIENT
Start: 2021-10-24 | End: 2021-10-25 | Stop reason: HOSPADM

## 2021-10-24 RX ORDER — CODEINE PHOSPHATE AND GUAIFENESIN 10; 100 MG/5ML; MG/5ML
5 SOLUTION ORAL EVERY 4 HOURS PRN
Status: DISCONTINUED | OUTPATIENT
Start: 2021-10-24 | End: 2021-10-25 | Stop reason: HOSPADM

## 2021-10-24 RX ORDER — POTASSIUM CHLORIDE 20 MEQ/1
40 TABLET, EXTENDED RELEASE ORAL
Status: DISCONTINUED | OUTPATIENT
Start: 2021-10-24 | End: 2021-10-25 | Stop reason: HOSPADM

## 2021-10-24 RX ORDER — SODIUM CHLORIDE 0.9 % (FLUSH) 0.9 %
10 SYRINGE (ML) INJECTION EVERY 12 HOURS PRN
Status: DISCONTINUED | OUTPATIENT
Start: 2021-10-24 | End: 2021-10-25 | Stop reason: HOSPADM

## 2021-10-24 RX ORDER — ACETAMINOPHEN 500 MG
1000 TABLET ORAL
Status: COMPLETED | OUTPATIENT
Start: 2021-10-24 | End: 2021-10-24

## 2021-10-24 RX ORDER — DEXAMETHASONE 2 MG/1
6 TABLET ORAL DAILY
Status: DISCONTINUED | OUTPATIENT
Start: 2021-10-25 | End: 2021-10-25 | Stop reason: HOSPADM

## 2021-10-24 RX ORDER — ALBUTEROL SULFATE 90 UG/1
2 AEROSOL, METERED RESPIRATORY (INHALATION) EVERY 6 HOURS PRN
Status: DISCONTINUED | OUTPATIENT
Start: 2021-10-24 | End: 2021-10-25 | Stop reason: HOSPADM

## 2021-10-24 RX ORDER — LANOLIN ALCOHOL/MO/W.PET/CERES
800 CREAM (GRAM) TOPICAL
Status: DISCONTINUED | OUTPATIENT
Start: 2021-10-24 | End: 2021-10-25 | Stop reason: HOSPADM

## 2021-10-24 RX ORDER — DEXAMETHASONE SODIUM PHOSPHATE 4 MG/ML
6 INJECTION, SOLUTION INTRA-ARTICULAR; INTRALESIONAL; INTRAMUSCULAR; INTRAVENOUS; SOFT TISSUE EVERY 24 HOURS
Status: DISCONTINUED | OUTPATIENT
Start: 2021-10-25 | End: 2021-10-24

## 2021-10-24 RX ORDER — POTASSIUM CHLORIDE 7.45 MG/ML
20 INJECTION INTRAVENOUS
Status: DISCONTINUED | OUTPATIENT
Start: 2021-10-24 | End: 2021-10-25 | Stop reason: HOSPADM

## 2021-10-24 RX ORDER — ALPRAZOLAM 0.5 MG/1
0.5 TABLET ORAL 3 TIMES DAILY PRN
Status: DISCONTINUED | OUTPATIENT
Start: 2021-10-24 | End: 2021-10-25 | Stop reason: HOSPADM

## 2021-10-24 RX ADMIN — REMDESIVIR 200 MG: 100 INJECTION, POWDER, LYOPHILIZED, FOR SOLUTION INTRAVENOUS at 07:10

## 2021-10-24 RX ADMIN — GUAIFENESIN AND CODEINE PHOSPHATE 5 ML: 100; 10 SOLUTION ORAL at 02:10

## 2021-10-24 RX ADMIN — ACETAMINOPHEN 1000 MG: 500 TABLET, FILM COATED ORAL at 02:10

## 2021-10-24 RX ADMIN — MELATONIN 6 MG: at 08:10

## 2021-10-24 RX ADMIN — DEXAMETHASONE SODIUM PHOSPHATE 6 MG: 4 INJECTION, SOLUTION INTRAMUSCULAR; INTRAVENOUS at 02:10

## 2021-10-24 RX ADMIN — GUAIFENESIN AND CODEINE PHOSPHATE 5 ML: 100; 10 SOLUTION ORAL at 08:10

## 2021-10-24 RX ADMIN — ENOXAPARIN SODIUM 40 MG: 40 INJECTION SUBCUTANEOUS at 08:10

## 2021-10-25 VITALS
TEMPERATURE: 98 F | WEIGHT: 234.38 LBS | BODY MASS INDEX: 46.01 KG/M2 | DIASTOLIC BLOOD PRESSURE: 78 MMHG | HEART RATE: 95 BPM | RESPIRATION RATE: 22 BRPM | OXYGEN SATURATION: 93 % | HEIGHT: 60 IN | SYSTOLIC BLOOD PRESSURE: 109 MMHG

## 2021-10-25 PROBLEM — J96.01 ACUTE HYPOXEMIC RESPIRATORY FAILURE: Status: RESOLVED | Noted: 2021-10-24 | Resolved: 2021-10-25

## 2021-10-25 LAB
ALBUMIN SERPL BCP-MCNC: 3.7 G/DL (ref 3.5–5.2)
ALP SERPL-CCNC: 92 U/L (ref 55–135)
ALT SERPL W/O P-5'-P-CCNC: 21 U/L (ref 10–44)
ANION GAP SERPL CALC-SCNC: 10 MMOL/L (ref 8–16)
AST SERPL-CCNC: 27 U/L (ref 10–40)
BASOPHILS # BLD AUTO: 0.01 K/UL (ref 0–0.2)
BASOPHILS NFR BLD: 0.4 % (ref 0–1.9)
BILIRUB SERPL-MCNC: 0.6 MG/DL (ref 0.1–1)
BUN SERPL-MCNC: 8 MG/DL (ref 6–20)
CALCIUM SERPL-MCNC: 9.4 MG/DL (ref 8.7–10.5)
CHLORIDE SERPL-SCNC: 102 MMOL/L (ref 95–110)
CO2 SERPL-SCNC: 26 MMOL/L (ref 23–29)
CREAT SERPL-MCNC: 0.7 MG/DL (ref 0.5–1.4)
D DIMER PPP IA.FEU-MCNC: 0.48 UG/ML FEU
DIFFERENTIAL METHOD: ABNORMAL
EOSINOPHIL # BLD AUTO: 0 K/UL (ref 0–0.5)
EOSINOPHIL NFR BLD: 0 % (ref 0–8)
ERYTHROCYTE [DISTWIDTH] IN BLOOD BY AUTOMATED COUNT: 13.3 % (ref 11.5–14.5)
EST. GFR  (AFRICAN AMERICAN): >60 ML/MIN/1.73 M^2
EST. GFR  (NON AFRICAN AMERICAN): >60 ML/MIN/1.73 M^2
GLUCOSE SERPL-MCNC: 146 MG/DL (ref 70–110)
HCT VFR BLD AUTO: 38.8 % (ref 37–48.5)
HGB BLD-MCNC: 12.1 G/DL (ref 12–16)
IMM GRANULOCYTES # BLD AUTO: 0 K/UL (ref 0–0.04)
IMM GRANULOCYTES NFR BLD AUTO: 0 % (ref 0–0.5)
LYMPHOCYTES # BLD AUTO: 1 K/UL (ref 1–4.8)
LYMPHOCYTES NFR BLD: 34.9 % (ref 18–48)
MCH RBC QN AUTO: 25.4 PG (ref 27–31)
MCHC RBC AUTO-ENTMCNC: 31.2 G/DL (ref 32–36)
MCV RBC AUTO: 82 FL (ref 82–98)
MONOCYTES # BLD AUTO: 0.3 K/UL (ref 0.3–1)
MONOCYTES NFR BLD: 10.5 % (ref 4–15)
NEUTROPHILS # BLD AUTO: 1.5 K/UL (ref 1.8–7.7)
NEUTROPHILS NFR BLD: 54.2 % (ref 38–73)
NRBC BLD-RTO: 0 /100 WBC
PLATELET # BLD AUTO: 290 K/UL (ref 150–450)
PMV BLD AUTO: 10.8 FL (ref 9.2–12.9)
POTASSIUM SERPL-SCNC: 5.1 MMOL/L (ref 3.5–5.1)
PROCALCITONIN SERPL IA-MCNC: <0.05 NG/ML (ref 0–0.5)
PROT SERPL-MCNC: 7.9 G/DL (ref 6–8.4)
RBC # BLD AUTO: 4.76 M/UL (ref 4–5.4)
SODIUM SERPL-SCNC: 138 MMOL/L (ref 136–145)
WBC # BLD AUTO: 2.75 K/UL (ref 3.9–12.7)

## 2021-10-25 PROCEDURE — 25000242 PHARM REV CODE 250 ALT 637 W/ HCPCS: Performed by: INTERNAL MEDICINE

## 2021-10-25 PROCEDURE — 84145 PROCALCITONIN (PCT): CPT | Performed by: INTERNAL MEDICINE

## 2021-10-25 PROCEDURE — 94761 N-INVAS EAR/PLS OXIMETRY MLT: CPT

## 2021-10-25 PROCEDURE — 36415 COLL VENOUS BLD VENIPUNCTURE: CPT | Performed by: NURSE PRACTITIONER

## 2021-10-25 PROCEDURE — 85379 FIBRIN DEGRADATION QUANT: CPT | Performed by: NURSE PRACTITIONER

## 2021-10-25 PROCEDURE — 25000003 PHARM REV CODE 250: Performed by: NURSE PRACTITIONER

## 2021-10-25 PROCEDURE — 94618 PULMONARY STRESS TESTING: CPT

## 2021-10-25 PROCEDURE — 80053 COMPREHEN METABOLIC PANEL: CPT | Performed by: NURSE PRACTITIONER

## 2021-10-25 PROCEDURE — 63600175 PHARM REV CODE 636 W HCPCS: Performed by: NURSE PRACTITIONER

## 2021-10-25 PROCEDURE — 85025 COMPLETE CBC W/AUTO DIFF WBC: CPT | Performed by: NURSE PRACTITIONER

## 2021-10-25 RX ORDER — DEXAMETHASONE 6 MG/1
6 TABLET ORAL DAILY
Qty: 5 TABLET | Refills: 0 | Status: SHIPPED | OUTPATIENT
Start: 2021-10-26 | End: 2021-10-31

## 2021-10-25 RX ORDER — CODEINE PHOSPHATE AND GUAIFENESIN 10; 100 MG/5ML; MG/5ML
5 SOLUTION ORAL EVERY 4 HOURS PRN
Qty: 118 ML | Refills: 0 | Status: SHIPPED | OUTPATIENT
Start: 2021-10-25 | End: 2021-10-30

## 2021-10-25 RX ADMIN — GUAIFENESIN AND CODEINE PHOSPHATE 5 ML: 100; 10 SOLUTION ORAL at 12:10

## 2021-10-25 RX ADMIN — LEVOTHYROXINE SODIUM 112 MCG: 112 TABLET ORAL at 05:10

## 2021-10-25 RX ADMIN — DEXAMETHASONE 6 MG: 2 TABLET ORAL at 09:10

## 2021-10-25 RX ADMIN — ENOXAPARIN SODIUM 40 MG: 40 INJECTION SUBCUTANEOUS at 09:10

## 2021-10-25 RX ADMIN — GUAIFENESIN AND CODEINE PHOSPHATE 5 ML: 100; 10 SOLUTION ORAL at 05:10

## 2021-10-29 LAB
BACTERIA BLD CULT: NORMAL
BACTERIA BLD CULT: NORMAL

## 2021-12-15 DIAGNOSIS — J12.82 PNEUMONIA DUE TO CORONAVIRUS DISEASE 2019 (CODE): Primary | ICD-10-CM

## 2022-01-12 ENCOUNTER — HOSPITAL ENCOUNTER (OUTPATIENT)
Dept: RADIOLOGY | Facility: HOSPITAL | Age: 37
Discharge: HOME | End: 2022-01-12
Attending: NURSE PRACTITIONER
Payer: MEDICAID

## 2022-01-12 DIAGNOSIS — J12.82 PNEUMONIA DUE TO CORONAVIRUS DISEASE 2019 (CODE): ICD-10-CM

## 2022-01-12 PROCEDURE — 71046 X-RAY EXAM CHEST 2 VIEWS: CPT | Mod: TC,PO

## 2022-03-29 ENCOUNTER — TELEPHONE (OUTPATIENT)
Dept: BARIATRICS | Facility: CLINIC | Age: 37
End: 2022-03-29
Payer: MEDICAID

## 2022-03-29 NOTE — TELEPHONE ENCOUNTER
called pt and explained we didn't take Medicaid for Bariatrics. Gave pt Cash price and she said she had to think about it.

## 2022-04-28 ENCOUNTER — HOSPITAL ENCOUNTER (EMERGENCY)
Facility: HOSPITAL | Age: 37
Discharge: HOME | End: 2022-04-28
Attending: EMERGENCY MEDICINE
Payer: MEDICAID

## 2022-04-28 VITALS
HEART RATE: 86 BPM | OXYGEN SATURATION: 99 % | DIASTOLIC BLOOD PRESSURE: 87 MMHG | RESPIRATION RATE: 18 BRPM | WEIGHT: 235 LBS | SYSTOLIC BLOOD PRESSURE: 139 MMHG | TEMPERATURE: 98 F | HEIGHT: 60 IN | BODY MASS INDEX: 46.13 KG/M2

## 2022-04-28 DIAGNOSIS — M54.10 RADICULOPATHY, UNSPECIFIED SPINAL REGION: Primary | ICD-10-CM

## 2022-04-28 LAB
B-HCG UR QL: NEGATIVE
CTP QC/QA: YES

## 2022-04-28 PROCEDURE — 81025 URINE PREGNANCY TEST: CPT | Performed by: EMERGENCY MEDICINE

## 2022-04-28 PROCEDURE — 99284 EMERGENCY DEPT VISIT MOD MDM: CPT

## 2022-04-28 PROCEDURE — 63600175 PHARM REV CODE 636 W HCPCS: Performed by: EMERGENCY MEDICINE

## 2022-04-28 PROCEDURE — 25000003 PHARM REV CODE 250: Performed by: EMERGENCY MEDICINE

## 2022-04-28 PROCEDURE — 96372 THER/PROPH/DIAG INJ SC/IM: CPT | Performed by: EMERGENCY MEDICINE

## 2022-04-28 RX ORDER — METHOCARBAMOL 500 MG/1
500 TABLET, FILM COATED ORAL
Status: COMPLETED | OUTPATIENT
Start: 2022-04-28 | End: 2022-04-28

## 2022-04-28 RX ORDER — OXYCODONE AND ACETAMINOPHEN 5; 325 MG/1; MG/1
1 TABLET ORAL
Status: COMPLETED | OUTPATIENT
Start: 2022-04-28 | End: 2022-04-28

## 2022-04-28 RX ORDER — DEXAMETHASONE SODIUM PHOSPHATE 4 MG/ML
8 INJECTION, SOLUTION INTRA-ARTICULAR; INTRALESIONAL; INTRAMUSCULAR; INTRAVENOUS; SOFT TISSUE
Status: COMPLETED | OUTPATIENT
Start: 2022-04-28 | End: 2022-04-28

## 2022-04-28 RX ORDER — OXYCODONE AND ACETAMINOPHEN 5; 325 MG/1; MG/1
1 TABLET ORAL EVERY 4 HOURS PRN
Qty: 10 TABLET | Refills: 0 | Status: SHIPPED | OUTPATIENT
Start: 2022-04-28 | End: 2023-02-15 | Stop reason: SDUPTHER

## 2022-04-28 RX ORDER — METHOCARBAMOL 500 MG/1
1000 TABLET, FILM COATED ORAL 3 TIMES DAILY
Qty: 30 TABLET | Refills: 0 | Status: SHIPPED | OUTPATIENT
Start: 2022-04-28 | End: 2022-05-03

## 2022-04-28 RX ADMIN — METHOCARBAMOL 500 MG: 500 TABLET ORAL at 04:04

## 2022-04-28 RX ADMIN — OXYCODONE AND ACETAMINOPHEN 1 TABLET: 5; 325 TABLET ORAL at 04:04

## 2022-04-28 RX ADMIN — DEXAMETHASONE SODIUM PHOSPHATE 8 MG: 4 INJECTION, SOLUTION INTRA-ARTICULAR; INTRALESIONAL; INTRAMUSCULAR; INTRAVENOUS; SOFT TISSUE at 04:04

## 2022-04-28 NOTE — DISCHARGE INSTRUCTIONS
Rest.  No bending or stooping or heavy weightlifting.  Follow-up with primary care provider and follow-up with a chiropractor and diffuse symptoms are persistent outpatient MRI of your lumbar spine recommended.

## 2022-04-28 NOTE — ED PROVIDER NOTES
Source of History:  Patient, chart    Chief complaint:  Back Pain (Left lower back pain that she has seen the chiropractor for and says it may be from overcompensation from a rotator cuff injury.)      HPI:  Sully Simmons is a 36 y.o. female presenting with left lower back for the past few years, worse over the last 3 days.  Pt states she has had sciatica since her shoulder injury and pregnancy in .  Pt states she is seen by a chiropractor for her pain.  Patient states she was seen by chiropractor with no relief of her pain.  Patient states she has taken Flexeril, ibuprofen, tramadol, ice, heat with no relief of symptoms.  Patient ambulated to the ER with a steady gait but states she was unable to walk prior to seeing a chiropractor.    This is the extent to the patients complaints today here in the emergency department.    ROS: As per HPI and below:  Constitutional: No fever.  No chills.  Eyes: No visual changes.   ENT: No sore throat. No ear pain.  Urinary: No abnormal urination.  MSK: positive for left lower back pain  Integument: No rashes or lesions.    Review of patient's allergies indicates:   Allergen Reactions    Imitrex [sumatriptan] Swelling     Throat swelling    Nsaids (non-steroidal anti-inflammatory drug) Other (See Comments)     NOT A TRUE ALLERGY!!   GI ulcers, told by gastroenterologist to not take NSAIDs       PMH:  As per HPI and below:  Past Medical History:   Diagnosis Date    Anxiety     MS (multiple sclerosis)     Neuromuscular disorder     Thyroid disease      Past Surgical History:   Procedure Laterality Date     SECTION  2008     SECTION  2010     SECTION  2014    HEMORRHOIDECTOMY INVOLVING TWO OR MORE ANAL COLUMNS  2016    LIPOMA RESECTION  2009    TONSILLECTOMY         Social History     Tobacco Use    Smoking status: Never Smoker   Substance Use Topics    Alcohol use: Never       Physical Exam:    BP (!) 147/98 (BP Location: Right arm, Patient  Position: Sitting)   Pulse 98   Temp 98.3 °F (36.8 °C) (Oral)   Resp 19   Ht 5' (1.524 m)   Wt 106.6 kg (235 lb)   SpO2 99%   BMI 45.90 kg/m²   Nursing note and vital signs reviewed.  Constitutional: No acute distress.  Nontoxic.  Obese  Head:  Normocephalic atraumatic  Eyes: No conjunctival injection.  Extraocular muscles are intact.  ENT: Normal phonation.  Musculoskeletal: TTP to left lower paraspinal area. No midline TTP.  (+) right straight leg test.  Steady gait appreciated.   Skin: No rashes seen.  Good turgor.  No abrasions.  No ecchymoses.  Psych: Appropriate, conversant.        MDM/ Differential Dx:     During evaluation patient offered steroids and muscle x-rays as well as Lidoderm patch.  Patient refused stating she has tried all those.  Patient requesting CT.  Patient advised she has no midline tenderness and CT is not indicated at this time.  Patient requesting MRI.  Patient advised emergent MRI not indicated.  Patient yelling and demanding to see a physician.  Patient advised that she can see a physician and will be place evaluated when physician is done with his current patient.  Patient advised unknown amount of time until she will be evaluated.  Patient started yelling and screaming that she wanted a physician immediately.       Dr Coyle aware and will see patient. Care signed over to Dr. Coyle.               Diagnostic Impression:    1. Chronic left-sided low back pain without sciatica                    Joy Saldana, ASIF  04/28/22 8345

## 2023-02-15 ENCOUNTER — HOSPITAL ENCOUNTER (EMERGENCY)
Facility: HOSPITAL | Age: 38
Discharge: HOME | End: 2023-02-15
Attending: EMERGENCY MEDICINE
Payer: MEDICAID

## 2023-02-15 VITALS
OXYGEN SATURATION: 99 % | HEIGHT: 60 IN | DIASTOLIC BLOOD PRESSURE: 84 MMHG | HEART RATE: 119 BPM | WEIGHT: 225 LBS | RESPIRATION RATE: 17 BRPM | TEMPERATURE: 99 F | BODY MASS INDEX: 44.17 KG/M2 | SYSTOLIC BLOOD PRESSURE: 142 MMHG

## 2023-02-15 DIAGNOSIS — S16.1XXA STRAIN OF NECK MUSCLE, INITIAL ENCOUNTER: Primary | ICD-10-CM

## 2023-02-15 DIAGNOSIS — M50.20 CERVICAL DISC HERNIATION: ICD-10-CM
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
## 2023-02-15 PROCEDURE — 99284 EMERGENCY DEPT VISIT MOD MDM: CPT | Mod: 25

## 2023-02-15 PROCEDURE — 25000003 PHARM REV CODE 250: Performed by: EMERGENCY MEDICINE

## 2023-02-15 RX ORDER — CYCLOBENZAPRINE HCL 10 MG
10 TABLET ORAL 3 TIMES DAILY PRN
Qty: 15 TABLET | Refills: 0 | Status: SHIPPED | OUTPATIENT
Start: 2023-02-15 | End: 2023-02-20

## 2023-02-15 RX ORDER — OXYCODONE AND ACETAMINOPHEN 5; 325 MG/1; MG/1
1 TABLET ORAL
Status: COMPLETED | OUTPATIENT
Start: 2023-02-15 | End: 2023-02-15

## 2023-02-15 RX ORDER — OXYCODONE AND ACETAMINOPHEN 5; 325 MG/1; MG/1
1 TABLET ORAL EVERY 6 HOURS PRN
Qty: 12 TABLET | Refills: 0 | Status: SHIPPED | OUTPATIENT
Start: 2023-02-15 | End: 2023-02-18

## 2023-02-15 RX ORDER — CYCLOBENZAPRINE HCL 10 MG
10 TABLET ORAL
Status: COMPLETED | OUTPATIENT
Start: 2023-02-15 | End: 2023-02-15

## 2023-02-15 RX ADMIN — OXYCODONE HYDROCHLORIDE AND ACETAMINOPHEN 1 TABLET: 5; 325 TABLET ORAL at 10:02

## 2023-02-15 RX ADMIN — CYCLOBENZAPRINE 10 MG: 10 TABLET, FILM COATED ORAL at 10:02

## 2023-02-16 ENCOUNTER — TELEPHONE (OUTPATIENT)
Dept: SPINE | Facility: CLINIC | Age: 38
End: 2023-02-16
Payer: MEDICAID
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
## 2023-02-20 ENCOUNTER — TELEPHONE (OUTPATIENT)
Dept: PAIN MEDICINE | Facility: CLINIC | Age: 38
End: 2023-02-20
Payer: MEDICAID

## 2023-02-20 NOTE — TELEPHONE ENCOUNTER
Strain of neck muscle referral from Dr Paulie Lou, May this patient be scheduled with you. Please advise. Thank you

## 2023-02-20 NOTE — TELEPHONE ENCOUNTER
----- Message from Paige Alberto sent at 2/17/2023  3:51 PM CST -----  Contact: [phone]  Type: Needs Medical Advice  Who Called:  Pt     Best Call Back Number: [phone] (home)     Additional Information: Pt is calling to schedule a NP appt. Pt has referral on file from ed. Pls call back and advise

## 2023-02-22 ENCOUNTER — TELEPHONE (OUTPATIENT)
Dept: SPINE | Facility: CLINIC | Age: 38
End: 2023-02-22
Payer: MEDICAID

## 2023-03-28 ENCOUNTER — PATIENT MESSAGE (OUTPATIENT)
Dept: RESEARCH | Facility: HOSPITAL | Age: 38
End: 2023-03-28
Payer: MEDICAID

## 2023-12-02 ENCOUNTER — HOSPITAL ENCOUNTER (EMERGENCY)
Facility: HOSPITAL | Age: 38
Discharge: HOME | End: 2023-12-02
Attending: EMERGENCY MEDICINE
Payer: MEDICAID

## 2023-12-02 VITALS
DIASTOLIC BLOOD PRESSURE: 70 MMHG | OXYGEN SATURATION: 100 % | TEMPERATURE: 98 F | RESPIRATION RATE: 18 BRPM | HEART RATE: 102 BPM | SYSTOLIC BLOOD PRESSURE: 122 MMHG

## 2023-12-02 DIAGNOSIS — Z45.2 ENCOUNTER FOR PERIPHERAL LINE PLACEMENT: Primary | ICD-10-CM

## 2023-12-02 DIAGNOSIS — Z76.0 MEDICATION REFILL: ICD-10-CM

## 2023-12-02 PROCEDURE — 99281 EMR DPT VST MAYX REQ PHY/QHP: CPT

## 2023-12-03 NOTE — ED PROVIDER NOTES
Encounter Date: 2023       History     Chief Complaint   Patient presents with    OTHER     Pt is on steroids at home and her IV has stopped working. Pt just needs another IV placed so she can take the final dose.      38-year-old female past medical history multiple sclerosis presents today with IV problem.  Patient reports she was recently diagnosed with an MS flare and started on IV steroids.  She reports she takes 1000 IV q.day for 5 days and unfortunately her IV fell out earlier today.  She is 1 more dose.  She reports her symptoms are at baseline since they started in not gotten any worse.  Denies any other complaints.  Patient reports she is only here for an IV to get her medications.    The history is provided by the patient. No  was used.     Review of patient's allergies indicates:   Allergen Reactions    Imitrex [sumatriptan] Swelling     Throat swelling    Nsaids (non-steroidal anti-inflammatory drug) Other (See Comments)     NOT A TRUE ALLERGY!!   GI ulcers, told by gastroenterologist to not take NSAIDs     Past Medical History:   Diagnosis Date    Anxiety     MS (multiple sclerosis)     Multiple sclerosis     Neuromuscular disorder     Thyroid disease      Past Surgical History:   Procedure Laterality Date     SECTION  2008     SECTION  2010     SECTION  2014    HEMORRHOIDECTOMY INVOLVING TWO OR MORE ANAL COLUMNS  2016    LIPOMA RESECTION  2009    TONSILLECTOMY       No family history on file.  Social History     Tobacco Use    Smoking status: Never   Substance Use Topics    Alcohol use: Never    Drug use: Never     Review of Systems    Physical Exam     Initial Vitals [23]   BP Pulse Resp Temp SpO2   122/70 (!) 129 16 98.2 °F (36.8 °C) 100 %      MAP       --         Physical Exam    Nursing note and vitals reviewed.  Constitutional: She appears well-developed and well-nourished.   HENT:   Head: Normocephalic and atraumatic.   Eyes:  Conjunctivae and EOM are normal.   Neck:   Normal range of motion.  Cardiovascular:  Regular rhythm.   Tachycardia present.         Pulmonary/Chest: No respiratory distress.   Abdominal: She exhibits no distension.   Musculoskeletal:         General: Normal range of motion.      Cervical back: Normal range of motion.     Neurological: She is alert and oriented to person, place, and time. No cranial nerve deficit. GCS score is 15. GCS eye subscore is 4. GCS verbal subscore is 5. GCS motor subscore is 6.   Skin: Skin is warm and dry. Capillary refill takes less than 2 seconds.   Psychiatric: She has a normal mood and affect. Thought content normal.         ED Course   Procedures  Labs Reviewed - No data to display       Imaging Results    None          Medications - No data to display  Medical Decision Making  IV placed for patient to finish her IV medication which he has not hand.  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  Offered to do further testing workup which patient declined I feel this is all reasonable given patient has no complaints in his only here for for IV replacement.  IV replace it bedside flushes and draws appropriately.  Will discharge for further outpatient management by her neurologist for MS flare.                                      Clinical Impression:  Final diagnoses:  [Z76.0] Medication refill  [Z45.2] Encounter for peripheral line placement (Primary)          ED Disposition Condition    Discharge Stable          ED Prescriptions    None       Follow-up Information       Follow up With Specialties Details Why Contact Info Additional Information    Romelia Bell NP Internal Medicine Go in 2 days  [address] [place] [address] [place] [address]  [phone]       [facility] -  Emergency Medicine Go to  As needed, If symptoms worsen [address] [place] [address] [address] [facility] [id number] 1st floor             Jorge Ramirez MD  12/03/23 1120

## 2023-12-03 NOTE — ED NOTES
Pt. Requested to have peripheral IV inserted for last done of IV steroid needed.  MD notified of pt. Request.

## 2023-12-03 NOTE — ED NOTES
MD aware and is fine with pt having IV inserted.  Pt. Will be discharged and sent home to get last IV steroid dose.

## 2024-07-15 ENCOUNTER — HOSPITAL ENCOUNTER (EMERGENCY)
Facility: HOSPITAL | Age: 39
Discharge: HOME | End: 2024-07-15
Attending: EMERGENCY MEDICINE
Payer: MEDICAID

## 2024-07-15 VITALS
TEMPERATURE: 99 F | RESPIRATION RATE: 18 BRPM | HEART RATE: 101 BPM | SYSTOLIC BLOOD PRESSURE: 131 MMHG | BODY MASS INDEX: 44.17 KG/M2 | DIASTOLIC BLOOD PRESSURE: 76 MMHG | WEIGHT: 225 LBS | OXYGEN SATURATION: 100 % | HEIGHT: 60 IN

## 2024-07-15 DIAGNOSIS — M54.9 MUSCULOSKELETAL BACK PAIN: Primary | ICD-10-CM

## 2024-07-15 DIAGNOSIS — R00.0 TACHYCARDIA: ICD-10-CM

## 2024-07-15 LAB
ALBUMIN SERPL BCP-MCNC: 4 G/DL (ref 3.5–5.2)
ALP SERPL-CCNC: 95 U/L (ref 55–135)
ALT SERPL W/O P-5'-P-CCNC: 21 U/L (ref 10–44)
ANION GAP SERPL CALC-SCNC: 10 MMOL/L (ref 8–16)
AST SERPL-CCNC: 28 U/L (ref 10–40)
BASOPHILS # BLD AUTO: 0.04 K/UL (ref 0–0.2)
BASOPHILS NFR BLD: 0.5 % (ref 0–1.9)
BILIRUB SERPL-MCNC: 0.6 MG/DL (ref 0.1–1)
BNP SERPL-MCNC: <10 PG/ML (ref 0–99)
BUN SERPL-MCNC: 7 MG/DL (ref 6–20)
CALCIUM SERPL-MCNC: 10.2 MG/DL (ref 8.7–10.5)
CHLORIDE SERPL-SCNC: 107 MMOL/L (ref 95–110)
CO2 SERPL-SCNC: 24 MMOL/L (ref 23–29)
CREAT SERPL-MCNC: 0.9 MG/DL (ref 0.5–1.4)
DIFFERENTIAL METHOD BLD: ABNORMAL
EOSINOPHIL # BLD AUTO: 0.2 K/UL (ref 0–0.5)
EOSINOPHIL NFR BLD: 2 % (ref 0–8)
ERYTHROCYTE [DISTWIDTH] IN BLOOD BY AUTOMATED COUNT: 13.2 % (ref 11.5–14.5)
EST. GFR  (NO RACE VARIABLE): >60 ML/MIN/1.73 M^2
GLUCOSE SERPL-MCNC: 91 MG/DL (ref 70–110)
HCT VFR BLD AUTO: 38.5 % (ref 37–48.5)
HGB BLD-MCNC: 12 G/DL (ref 12–16)
IMM GRANULOCYTES # BLD AUTO: 0.02 K/UL (ref 0–0.04)
IMM GRANULOCYTES NFR BLD AUTO: 0.3 % (ref 0–0.5)
LYMPHOCYTES # BLD AUTO: 1.2 K/UL (ref 1–4.8)
LYMPHOCYTES NFR BLD: 14.9 % (ref 18–48)
MCH RBC QN AUTO: 25.9 PG (ref 27–31)
MCHC RBC AUTO-ENTMCNC: 31.2 G/DL (ref 32–36)
MCV RBC AUTO: 83 FL (ref 82–98)
MONOCYTES # BLD AUTO: 0.8 K/UL (ref 0.3–1)
MONOCYTES NFR BLD: 9.8 % (ref 4–15)
NEUTROPHILS # BLD AUTO: 5.7 K/UL (ref 1.8–7.7)
NEUTROPHILS NFR BLD: 72.5 % (ref 38–73)
NRBC BLD-RTO: 0 /100 WBC
PLATELET # BLD AUTO: 353 K/UL (ref 150–450)
PMV BLD AUTO: 10.5 FL (ref 9.2–12.9)
POTASSIUM SERPL-SCNC: 3.9 MMOL/L (ref 3.5–5.1)
PROT SERPL-MCNC: 7.6 G/DL (ref 6–8.4)
RBC # BLD AUTO: 4.64 M/UL (ref 4–5.4)
SODIUM SERPL-SCNC: 141 MMOL/L (ref 136–145)
TROPONIN I SERPL DL<=0.01 NG/ML-MCNC: <0.006 NG/ML (ref 0–0.03)
WBC # BLD AUTO: 7.84 K/UL (ref 3.9–12.7)

## 2024-07-15 PROCEDURE — 85025 COMPLETE CBC W/AUTO DIFF WBC: CPT | Performed by: NURSE PRACTITIONER

## 2024-07-15 PROCEDURE — 83880 ASSAY OF NATRIURETIC PEPTIDE: CPT | Performed by: NURSE PRACTITIONER

## 2024-07-15 PROCEDURE — 96372 THER/PROPH/DIAG INJ SC/IM: CPT | Performed by: NURSE PRACTITIONER

## 2024-07-15 PROCEDURE — 99285 EMERGENCY DEPT VISIT HI MDM: CPT | Mod: 25

## 2024-07-15 PROCEDURE — 63600175 PHARM REV CODE 636 W HCPCS: Performed by: NURSE PRACTITIONER

## 2024-07-15 PROCEDURE — 84484 ASSAY OF TROPONIN QUANT: CPT | Performed by: NURSE PRACTITIONER

## 2024-07-15 PROCEDURE — 80053 COMPREHEN METABOLIC PANEL: CPT | Performed by: NURSE PRACTITIONER

## 2024-07-15 PROCEDURE — 25000003 PHARM REV CODE 250: Performed by: NURSE PRACTITIONER

## 2024-07-15 PROCEDURE — 25500020 PHARM REV CODE 255

## 2024-07-15 PROCEDURE — 36415 COLL VENOUS BLD VENIPUNCTURE: CPT | Performed by: NURSE PRACTITIONER

## 2024-07-15 RX ORDER — METHOCARBAMOL 500 MG/1
1000 TABLET, FILM COATED ORAL 3 TIMES DAILY
Qty: 30 TABLET | Refills: 0 | Status: SHIPPED | OUTPATIENT
Start: 2024-07-15 | End: 2024-07-20

## 2024-07-15 RX ORDER — ONDANSETRON 4 MG/1
4 TABLET, FILM COATED ORAL EVERY 6 HOURS PRN
Qty: 20 TABLET | Refills: 1 | Status: SHIPPED | OUTPATIENT
Start: 2024-07-15 | End: 2025-07-15

## 2024-07-15 RX ORDER — ORPHENADRINE CITRATE 30 MG/ML
60 INJECTION INTRAMUSCULAR; INTRAVENOUS
Status: COMPLETED | OUTPATIENT
Start: 2024-07-15 | End: 2024-07-15

## 2024-07-15 RX ORDER — ONDANSETRON 4 MG/1
4 TABLET, ORALLY DISINTEGRATING ORAL
Status: COMPLETED | OUTPATIENT
Start: 2024-07-15 | End: 2024-07-15

## 2024-07-15 RX ORDER — NAPROXEN 500 MG/1
500 TABLET ORAL 2 TIMES DAILY WITH MEALS
Qty: 30 TABLET | Refills: 0 | Status: SHIPPED | OUTPATIENT
Start: 2024-07-15

## 2024-07-15 RX ORDER — FAMOTIDINE 20 MG/1
20 TABLET, FILM COATED ORAL
Status: COMPLETED | OUTPATIENT
Start: 2024-07-15 | End: 2024-07-15

## 2024-07-15 RX ORDER — PANTOPRAZOLE SODIUM 20 MG/1
40 TABLET, DELAYED RELEASE ORAL DAILY
Qty: 30 TABLET | Refills: 0 | Status: SHIPPED | OUTPATIENT
Start: 2024-07-15 | End: 2024-08-14

## 2024-07-15 RX ORDER — KETOROLAC TROMETHAMINE 30 MG/ML
30 INJECTION, SOLUTION INTRAMUSCULAR; INTRAVENOUS
Status: COMPLETED | OUTPATIENT
Start: 2024-07-15 | End: 2024-07-15

## 2024-07-15 RX ADMIN — FAMOTIDINE 20 MG: 20 TABLET, FILM COATED ORAL at 10:07

## 2024-07-15 RX ADMIN — ORPHENADRINE CITRATE 60 MG: 60 INJECTION INTRAMUSCULAR; INTRAVENOUS at 12:07

## 2024-07-15 RX ADMIN — KETOROLAC TROMETHAMINE 30 MG: 30 INJECTION, SOLUTION INTRAMUSCULAR at 12:07

## 2024-07-15 RX ADMIN — ONDANSETRON 4 MG: 4 TABLET, ORALLY DISINTEGRATING ORAL at 10:07

## 2024-07-15 RX ADMIN — IOHEXOL 75 ML: 350 INJECTION, SOLUTION INTRAVENOUS at 01:07

## 2024-07-15 NOTE — ED PROVIDER NOTES
"Encounter Date: 7/15/2024       History     Chief Complaint   Patient presents with    rt. rib area pain      Heavy lifting x 2 days      Carley Simmons is a 38 year old female with pmh anxiety, MS, thyroid disease presenting to the ED with c/o right back/chest pain. She states she has been doing frequent heavy lifting and strenuous work for the past couple of days. She had no direct injury or trauma to the area but reports pain with movement of the upper extremities/trunk. She has taken gabapentin, flexeril, tramadol, ibuprofen, applied lidoderm patch and reports no relief of pain. She is requesting "something strong" for her pain since she is going on vacation next week and specifically requests hydrocodone/oxycodone.       Review of patient's allergies indicates:   Allergen Reactions    Imitrex [sumatriptan] Swelling     Throat swelling    Nsaids (non-steroidal anti-inflammatory drug) Other (See Comments)     NOT A TRUE ALLERGY!!   GI ulcers, told by gastroenterologist to not take NSAIDs     Past Medical History:   Diagnosis Date    Anxiety     MS (multiple sclerosis)     Multiple sclerosis     Neuromuscular disorder     Thyroid disease      Past Surgical History:   Procedure Laterality Date     SECTION  2008     SECTION  2010     SECTION  2014    HEMORRHOIDECTOMY INVOLVING TWO OR MORE ANAL COLUMNS  2016    LIPOMA RESECTION  2009    TONSILLECTOMY       No family history on file.  Social History     Tobacco Use    Smoking status: Never   Substance Use Topics    Alcohol use: Never    Drug use: Never     Review of Systems   Constitutional:  Negative for fever.   HENT:  Negative for sore throat.    Respiratory:  Negative for shortness of breath.    Cardiovascular:  Negative for chest pain.   Gastrointestinal:  Negative for nausea.   Genitourinary:  Negative for dysuria.   Musculoskeletal:  Positive for back pain.   Skin:  Negative for rash.   Neurological:  Negative for weakness. "   Hematological:  Does not bruise/bleed easily.       Physical Exam     Initial Vitals [07/15/24 0932]   BP Pulse Resp Temp SpO2   120/72 (!) 113 18 99.3 °F (37.4 °C) 100 %      MAP       --         Physical Exam    Nursing note and vitals reviewed.  Constitutional: She appears well-developed and well-nourished. She is not diaphoretic. No distress.   HENT:   Head: Normocephalic and atraumatic.   Mouth/Throat: Oropharynx is clear and moist.   Eyes: Conjunctivae are normal.   Neck: Neck supple.   Cardiovascular:  Regular rhythm, normal heart sounds and intact distal pulses.   Tachycardia present.   Exam reveals no gallop and no friction rub.       No murmur heard.  Pulmonary/Chest: Breath sounds normal. No respiratory distress. She has no wheezes. She has no rhonchi. She has no rales.   Abdominal: Abdomen is soft. She exhibits no distension. There is no abdominal tenderness.   Musculoskeletal:         General: Normal range of motion.      Cervical back: Neck supple.        Back:      Neurological: She is alert and oriented to person, place, and time.   Skin: No rash noted. No erythema.   Psychiatric: Her speech is normal.         ED Course   Procedures  Labs Reviewed   CBC W/ AUTO DIFFERENTIAL - Abnormal; Notable for the following components:       Result Value    MCH 25.9 (*)     MCHC 31.2 (*)     Lymph % 14.9 (*)     All other components within normal limits   COMPREHENSIVE METABOLIC PANEL   TROPONIN I   B-TYPE NATRIURETIC PEPTIDE     EKG Readings: (Independently Interpreted)   Initial Reading: No STEMI. Rhythm: Normal Sinus Rhythm. Ectopy: No Ectopy. Conduction: Normal.       Imaging Results              CTA Chest Non-Coronary (PE Studies) (Final result)  Result time 07/15/24 14:03:54      Final result by Karen Savage MD (07/15/24 14:03:54)                   Impression:      There is no evidence pulmonary embolus.      Electronically signed by: Karen Savage MD  Date:    07/15/2024  Time:    14:03                Narrative:    EXAMINATION:  CTA CHEST NON CORONARY (PE STUDIES)    CLINICAL HISTORY:  Pulmonary embolism (PE) suspected, unknown D-dimer;    TECHNIQUE:  Low dose axial images, sagittal and coronal reformations were obtained from the thoracic inlet to the lung bases following the IV administration of 100 mL of Omnipaque 350.  Contrast timing was optimized to evaluate the pulmonary arteries.  MIP images were performed.    COMPARISON:  None    FINDINGS:  There is no pulmonary arterial filling defect.  There is no pneumothorax, pleural effusion or focal consolidation.  There is no pericardial effusion.  There is no axillary nor mediastinal lymphadenopathy.  Visualized portions the superior abdomen are unremarkable the osseous structures are unremarkable.                                       Medications   ondansetron disintegrating tablet 4 mg (4 mg Oral Given 7/15/24 1023)   famotidine tablet 20 mg (20 mg Oral Given 7/15/24 1023)   ketorolac injection 30 mg (30 mg Intramuscular Given 7/15/24 1234)   orphenadrine injection 60 mg (60 mg Intramuscular Given 7/15/24 1231)   iohexoL (OMNIPAQUE 350) 350 mg iodine/mL injection (75 mLs Intravenous Given 7/15/24 1327)     Medical Decision Making  38-year-old female presented emergency department with back pain in the upper back which is pleuritic in nature and also consistent with musculoskeletal pain however as patient tachycardic and having pleuritic upper back pain CT of the chest done.  Patient was initially seen by Ms. Banda however patient is requesting narcotic pain medication and there was a disagreement so care was transferred over to me.  Upon counseling patient agreed to take nonnarcotic pain medication.  Given patient's tachycardia and presentation squeeze a CT of the chest done for evaluation for pulmonary emboli and patient does not have evidence of pulmonary emboli.  Patient did have significant improvement of symptoms and pain almost completely resolved.   Patient's pain consistent with musculoskeletal pain.  Discharged with return precautions and instructions and follow-up with primary care.    Amount and/or Complexity of Data Reviewed  Labs: ordered. Decision-making details documented in ED Course.  Radiology: ordered. Decision-making details documented in ED Course.  ECG/medicine tests: ordered. Decision-making details documented in ED Course.  Discussion of management or test interpretation with external provider(s): Agree with plan and disposition and management.  I evaluated the patient myself along with the mid-level provider and examined the patient and agree with plan and management.        Risk  Prescription drug management.         APC / Resident Notes:   Patient initially requested narcotic pain medication due to an upcoming vacation. Requested to see a physician when she was informed this medication would not be prescribed as the initial treatment of choice. She was evaluated by Dr. Coyle and PE study/labs ordered. PE study negative, Labs unremarkable. She was discharged by Dr. Coyle.                                Clinical Impression:  Final diagnoses:  [R00.0] Tachycardia  [M54.9] Musculoskeletal back pain (Primary)          ED Disposition Condition    Discharge Stable          ED Prescriptions       Medication Sig Dispense Start Date End Date Auth. Provider    naproxen (NAPROSYN) 500 MG tablet Take 1 tablet (500 mg total) by mouth 2 (two) times daily with meals. 30 tablet 7/15/2024 -- Arnoldo Coyle MD    methocarbamoL (ROBAXIN) 500 MG Tab Take 2 tablets (1,000 mg total) by mouth 3 (three) times daily. for 5 days 30 tablet 7/15/2024 7/20/2024 Arnoldo Coyle MD    pantoprazole (PROTONIX) 20 MG tablet Take 2 tablets (40 mg total) by mouth once daily. 30 tablet 7/15/2024 8/14/2024 Arnoldo Coyle MD    ondansetron (ZOFRAN) 4 MG tablet Take 1 tablet (4 mg total) by mouth every 6 (six) hours as needed. 20 tablet 7/15/2024 7/15/2025 Arnoldo Coyle MD          Follow-up  Information       Follow up With Specialties Details Why Contact Info    Romelia Bell NP Internal Medicine In 2 days  [address]  [place] [address]  [phone]               Arnoldo Coyle MD  07/15/24 [id number]       Florina Banda NP  07/15/24 [id number]

## 2024-07-15 NOTE — ED NOTES
Assumed care:  Sully Simmons is awake, alert and oriented x 3, skin warm and dry, in NAD with family at bedside.  Patient states that she has been doing some heavy lifting, cutting trees down and moving furniture.  States 2 days ago, she started with pain to right ribs worse with movement.  States that she has taken toradol, flexeril, bio freeze, and lidocaine patches with no relief.      Patient identifiers for Sully Simmons checked and correct.  LOC:  Sully Simmons is awake, alert, and aware of environment with an appropriate affect. She is oriented x 3 and speaking appropriately.  APPEARANCE:  She is resting comfortably and in no acute distress. She is clean and well groomed, patient's clothing is properly fastened.  SKIN:  The skin is warm and dry. She has normal skin turgor and moist mucus membranes. Skin is intact; no bruising or breakdown noted.  MUSCULOSKELETAL:  She is moving all extremities well, no obvious deformities noted. Pulses intact. Right rib pain  RESPIRATORY:  Airway is open and patent. Respirations are spontaneous and non-labored with normal effort and rate.  CARDIAC:  She has a normal rate and rhythm. No peripheral edema noted. Capillary refill < 3 seconds.  ABDOMEN:  No distention noted.  Soft and non-tender upon palpation.  NEUROLOGICAL:  PERRL. Facial expression is symmetrical. Hand grasps are equal bilaterally. Normal sensation in all extremities when touched with finger.  Allergies reported:    Review of patient's allergies indicates:   Allergen Reactions    Imitrex [sumatriptan] Swelling     Throat swelling    Nsaids (non-steroidal anti-inflammatory drug) Other (See Comments)     NOT A TRUE ALLERGY!!   GI ulcers, told by gastroenterologist to not take NSAIDs

## 2024-07-16 ENCOUNTER — NURSE TRIAGE (OUTPATIENT)
Dept: ADMINISTRATIVE | Facility: CLINIC | Age: 39
End: 2024-07-16
Payer: COMMERCIAL

## 2024-07-16 ENCOUNTER — TELEPHONE (OUTPATIENT)
Dept: ADMINISTRATIVE | Facility: CLINIC | Age: 39
End: 2024-07-16
Payer: COMMERCIAL

## 2024-07-16 NOTE — TELEPHONE ENCOUNTER
Pt calling in, went to ED yesterday for intercostal pain. Was given muscle relaxer and toradol. Was feeling better but now back pain is back. Taken baclofen with no relief. Tried ice and heat with no relief. Rates pain 8/10. Unable to bear weight. Also having cough and CP, difficulty breathing. Dispo is ED now. Pt verbalizes understanding. Advised to call back with further concerns.    Reason for Disposition   Weakness of a leg or foot (e.g., unable to bear weight, dragging foot)   Difficulty breathing    Additional Information   Negative: Passed out (i.e., fainted, collapsed and was not responding)   Negative: Shock suspected (e.g., cold/pale/clammy skin, too weak to stand, low BP, rapid pulse)   Negative: Sounds like a life-threatening emergency to the triager   Negative: SEVERE back pain of sudden onset and age > 60 years   Negative: SEVERE abdominal pain (e.g., excruciating)   Negative: Abdominal pain and age > 60 years   Negative: Unable to urinate (or only a few drops) and bladder feels very full   Negative: Loss of bladder or bowel control (urine or bowel incontinence; wetting self, leaking stool) of new-onset   Negative: Numbness (loss of sensation) in groin or rectal area   Negative: Pain radiates into groin, scrotum   Negative: Blood in urine (red, pink, or tea-colored)   Negative: Vomiting and pain over lower ribs of back (i.e., flank - kidney area)   Negative: SEVERE difficulty breathing (e.g., struggling for each breath, speaks in single words)   Negative: Difficult to awaken or acting confused (e.g., disoriented, slurred speech)   Negative: Shock suspected (e.g., cold/pale/clammy skin, too weak to stand, low BP, rapid pulse)   Negative: Passed out (i.e., lost consciousness, collapsed and was not responding)   Negative: Chest pain lasting longer than 5 minutes and over 44 years old   Negative: Chest pain lasting longer than 5 minutes, over 30 years old, and at least one cardiac risk factor (e.g.,  diabetes mellitus, high blood pressure, high cholesterol, smoker, or strong family history of heart disease)   Negative: Chest pain lasting longer than 5 minutes and history of heart disease (i.e., angina, heart attack, heart failure, bypass surgery, takes nitroglycerin)   Negative: Chest pain lasting longer than 5 minutes and pain is crushing, pressure-like, or heavy   Negative: Heart beating < 50 beats per minute OR > 140 beats per minute   Negative: Visible sweat on face or sweat dripping down face   Negative: Sounds like a life-threatening emergency to the triager   Negative: SEVERE chest pain   Negative: Pain also in shoulder(s) or arm(s) or jaw    Protocols used: Back Pain-A-OH, Chest Pain-A-OH

## 2024-07-16 NOTE — PROGRESS NOTES
Symptom: Back Pain - Not From Injury  Outcome: Instruct patient to Call 911 NOW!  Reason: Age over 30: sudden AND severe upper back pain    The caller rejected this outcome. Transferred to nurse.

## 2025-01-14 DIAGNOSIS — Z86.16 PERSONAL HISTORY OF COVID-19: Primary | ICD-10-CM

## 2025-01-15 DIAGNOSIS — E11.9 TYPE 2 DIABETES MELLITUS WITHOUT COMPLICATIONS: Primary | ICD-10-CM

## 2025-01-15 DIAGNOSIS — E11.9 TYPE 2 DIABETES MELLITUS WITHOUT COMPLICATION: ICD-10-CM

## 2025-03-25 ENCOUNTER — HOSPITAL ENCOUNTER (OUTPATIENT)
Dept: RADIOLOGY | Facility: HOSPITAL | Age: 40
Discharge: HOME | End: 2025-03-25
Attending: NURSE PRACTITIONER
Payer: MEDICAID

## 2025-03-25 DIAGNOSIS — Z86.16 PERSONAL HISTORY OF COVID-19: ICD-10-CM

## 2025-03-25 PROCEDURE — 71046 X-RAY EXAM CHEST 2 VIEWS: CPT | Mod: 26,,, | Performed by: RADIOLOGY

## 2025-03-25 PROCEDURE — 71046 X-RAY EXAM CHEST 2 VIEWS: CPT | Mod: TC,PO

## 2025-06-27 ENCOUNTER — HOSPITAL ENCOUNTER (OUTPATIENT)
Facility: HOSPITAL | Age: 40
Discharge: HOME | End: 2025-06-28
Attending: EMERGENCY MEDICINE | Admitting: HOSPITALIST
Payer: MEDICAID

## 2025-06-27 DIAGNOSIS — R07.9 CHEST PAIN: ICD-10-CM

## 2025-06-27 DIAGNOSIS — R10.10 UPPER ABDOMINAL PAIN: Primary | ICD-10-CM

## 2025-06-27 DIAGNOSIS — K92.2 GI BLEED: ICD-10-CM

## 2025-06-27 LAB
ABSOLUTE EOSINOPHIL (SMH): 0.09 K/UL
ABSOLUTE MONOCYTE (SMH): 0.52 K/UL (ref 0.3–1)
ABSOLUTE NEUTROPHIL COUNT (SMH): 5.3 K/UL (ref 1.8–7.7)
ALBUMIN SERPL-MCNC: 4.1 G/DL (ref 3.5–5.2)
ALP SERPL-CCNC: 77 UNIT/L (ref 40–150)
ALT SERPL-CCNC: 13 UNIT/L (ref 10–44)
ANION GAP (SMH): 13 MMOL/L (ref 8–16)
AST SERPL-CCNC: 26 UNIT/L (ref 11–45)
B-HCG UR QL: NEGATIVE
BASOPHILS # BLD AUTO: 0.06 K/UL
BASOPHILS NFR BLD AUTO: 0.8 %
BILIRUB SERPL-MCNC: 0.6 MG/DL (ref 0.1–1)
BILIRUB UR QL STRIP.AUTO: NEGATIVE
BUN SERPL-MCNC: 8 MG/DL (ref 6–20)
CALCIUM SERPL-MCNC: 9.6 MG/DL (ref 8.7–10.5)
CHLORIDE SERPL-SCNC: 104 MMOL/L (ref 95–110)
CLARITY UR: CLEAR
CO2 SERPL-SCNC: 22 MMOL/L (ref 23–29)
COLOR UR AUTO: YELLOW
CREAT SERPL-MCNC: 0.7 MG/DL (ref 0.5–1.4)
CTP QC/QA: YES
ERYTHROCYTE [DISTWIDTH] IN BLOOD BY AUTOMATED COUNT: 14.5 % (ref 11.5–14.5)
GFR SERPLBLD CREATININE-BSD FMLA CKD-EPI: >60 ML/MIN/1.73/M2
GLUCOSE SERPL-MCNC: 71 MG/DL (ref 70–110)
GLUCOSE UR QL STRIP: NEGATIVE
HCT VFR BLD AUTO: 37.7 % (ref 37–48.5)
HGB BLD-MCNC: 11.9 GM/DL (ref 12–16)
HGB UR QL STRIP: NEGATIVE
IMM GRANULOCYTES # BLD AUTO: 0.01 K/UL (ref 0–0.04)
IMM GRANULOCYTES NFR BLD AUTO: 0.1 % (ref 0–0.5)
INDIRECT COOMBS: NORMAL
KETONES UR QL STRIP: ABNORMAL
LEUKOCYTE ESTERASE UR QL STRIP: NEGATIVE
LIPASE SERPL-CCNC: 37 U/L (ref 4–60)
LYMPHOCYTES # BLD AUTO: 1.36 K/UL (ref 1–4.8)
MCH RBC QN AUTO: 26.9 PG (ref 27–31)
MCHC RBC AUTO-ENTMCNC: 31.6 G/DL (ref 32–36)
MCV RBC AUTO: 85 FL (ref 82–98)
NITRITE UR QL STRIP: NEGATIVE
NUCLEATED RBC (/100WBC) (SMH): 0 /100 WBC
PH UR STRIP: 6 [PH]
PLATELET # BLD AUTO: 287 K/UL (ref 150–450)
PMV BLD AUTO: 11.3 FL (ref 9.2–12.9)
POCT GLUCOSE: 54 MG/DL (ref 70–110)
POCT GLUCOSE: 63 MG/DL (ref 70–110)
POCT GLUCOSE: 98 MG/DL (ref 70–110)
POTASSIUM SERPL-SCNC: 4.2 MMOL/L (ref 3.5–5.1)
PROT SERPL-MCNC: 7.6 GM/DL (ref 6–8.4)
PROT UR QL STRIP: NEGATIVE
RBC # BLD AUTO: 4.42 M/UL (ref 4–5.4)
RELATIVE EOSINOPHIL (SMH): 1.2 % (ref 0–8)
RELATIVE LYMPHOCYTE (SMH): 18.5 % (ref 18–48)
RELATIVE MONOCYTE (SMH): 7.1 % (ref 4–15)
RELATIVE NEUTROPHIL (SMH): 72.3 % (ref 38–73)
RH BLD: NORMAL
SODIUM SERPL-SCNC: 139 MMOL/L (ref 136–145)
SP GR UR STRIP: 1.01
SPECIMEN OUTDATE: NORMAL
UROBILINOGEN UR STRIP-ACNC: NEGATIVE EU/DL
WBC # BLD AUTO: 7.34 K/UL (ref 3.9–12.7)

## 2025-06-27 PROCEDURE — 36415 COLL VENOUS BLD VENIPUNCTURE: CPT | Performed by: EMERGENCY MEDICINE

## 2025-06-27 PROCEDURE — 36415 COLL VENOUS BLD VENIPUNCTURE: CPT | Performed by: NURSE PRACTITIONER

## 2025-06-27 PROCEDURE — 80053 COMPREHEN METABOLIC PANEL: CPT | Performed by: NURSE PRACTITIONER

## 2025-06-27 PROCEDURE — 63600175 PHARM REV CODE 636 W HCPCS: Performed by: NURSE PRACTITIONER

## 2025-06-27 PROCEDURE — 93010 ELECTROCARDIOGRAM REPORT: CPT | Mod: ,,, | Performed by: INTERNAL MEDICINE

## 2025-06-27 PROCEDURE — 83690 ASSAY OF LIPASE: CPT | Performed by: NURSE PRACTITIONER

## 2025-06-27 PROCEDURE — 25000003 PHARM REV CODE 250

## 2025-06-27 PROCEDURE — 96374 THER/PROPH/DIAG INJ IV PUSH: CPT

## 2025-06-27 PROCEDURE — 81025 URINE PREGNANCY TEST: CPT | Performed by: NURSE PRACTITIONER

## 2025-06-27 PROCEDURE — 82962 GLUCOSE BLOOD TEST: CPT

## 2025-06-27 PROCEDURE — 85025 COMPLETE CBC W/AUTO DIFF WBC: CPT | Performed by: NURSE PRACTITIONER

## 2025-06-27 PROCEDURE — 96375 TX/PRO/DX INJ NEW DRUG ADDON: CPT

## 2025-06-27 PROCEDURE — 94760 N-INVAS EAR/PLS OXIMETRY 1: CPT

## 2025-06-27 PROCEDURE — 93005 ELECTROCARDIOGRAM TRACING: CPT

## 2025-06-27 PROCEDURE — 81003 URINALYSIS AUTO W/O SCOPE: CPT | Performed by: NURSE PRACTITIONER

## 2025-06-27 PROCEDURE — G0378 HOSPITAL OBSERVATION PER HR: HCPCS

## 2025-06-27 PROCEDURE — 86900 BLOOD TYPING SEROLOGIC ABO: CPT | Performed by: EMERGENCY MEDICINE

## 2025-06-27 PROCEDURE — 99285 EMERGENCY DEPT VISIT HI MDM: CPT | Mod: 25

## 2025-06-27 PROCEDURE — 63600175 PHARM REV CODE 636 W HCPCS

## 2025-06-27 RX ORDER — ZOLPIDEM TARTRATE 5 MG/1
10 TABLET ORAL NIGHTLY
Status: DISCONTINUED | OUTPATIENT
Start: 2025-06-27 | End: 2025-06-28 | Stop reason: HOSPADM

## 2025-06-27 RX ORDER — ONDANSETRON HYDROCHLORIDE 2 MG/ML
4 INJECTION, SOLUTION INTRAVENOUS EVERY 8 HOURS PRN
Status: DISCONTINUED | OUTPATIENT
Start: 2025-06-27 | End: 2025-06-28 | Stop reason: HOSPADM

## 2025-06-27 RX ORDER — LIDOCAINE HYDROCHLORIDE 20 MG/ML
15 SOLUTION OROPHARYNGEAL ONCE
Status: COMPLETED | OUTPATIENT
Start: 2025-06-27 | End: 2025-06-27

## 2025-06-27 RX ORDER — IBUPROFEN 200 MG
16 TABLET ORAL
Status: DISCONTINUED | OUTPATIENT
Start: 2025-06-27 | End: 2025-06-28 | Stop reason: HOSPADM

## 2025-06-27 RX ORDER — PANTOPRAZOLE SODIUM 40 MG/10ML
80 INJECTION, POWDER, LYOPHILIZED, FOR SOLUTION INTRAVENOUS
Status: COMPLETED | OUTPATIENT
Start: 2025-06-27 | End: 2025-06-27

## 2025-06-27 RX ORDER — PANTOPRAZOLE SODIUM 40 MG/10ML
40 INJECTION, POWDER, LYOPHILIZED, FOR SOLUTION INTRAVENOUS 2 TIMES DAILY
Status: DISCONTINUED | OUTPATIENT
Start: 2025-06-28 | End: 2025-06-28 | Stop reason: HOSPADM

## 2025-06-27 RX ORDER — SODIUM CHLORIDE, SODIUM LACTATE, POTASSIUM CHLORIDE, CALCIUM CHLORIDE 600; 310; 30; 20 MG/100ML; MG/100ML; MG/100ML; MG/100ML
INJECTION, SOLUTION INTRAVENOUS CONTINUOUS
Status: DISCONTINUED | OUTPATIENT
Start: 2025-06-27 | End: 2025-06-28 | Stop reason: HOSPADM

## 2025-06-27 RX ORDER — FAMOTIDINE 10 MG/ML
20 INJECTION, SOLUTION INTRAVENOUS 2 TIMES DAILY
Status: DISCONTINUED | OUTPATIENT
Start: 2025-06-27 | End: 2025-06-28 | Stop reason: HOSPADM

## 2025-06-27 RX ORDER — NALOXONE HCL 0.4 MG/ML
0.02 VIAL (ML) INJECTION
Status: DISCONTINUED | OUTPATIENT
Start: 2025-06-27 | End: 2025-06-28 | Stop reason: HOSPADM

## 2025-06-27 RX ORDER — PROCHLORPERAZINE EDISYLATE 5 MG/ML
5 INJECTION INTRAMUSCULAR; INTRAVENOUS EVERY 6 HOURS PRN
Status: DISCONTINUED | OUTPATIENT
Start: 2025-06-27 | End: 2025-06-28 | Stop reason: HOSPADM

## 2025-06-27 RX ORDER — ALUMINUM HYDROXIDE, MAGNESIUM HYDROXIDE, AND SIMETHICONE 1200; 120; 1200 MG/30ML; MG/30ML; MG/30ML
30 SUSPENSION ORAL ONCE
Status: COMPLETED | OUTPATIENT
Start: 2025-06-27 | End: 2025-06-27

## 2025-06-27 RX ORDER — IBUPROFEN 200 MG
24 TABLET ORAL
Status: DISCONTINUED | OUTPATIENT
Start: 2025-06-27 | End: 2025-06-28 | Stop reason: HOSPADM

## 2025-06-27 RX ORDER — GLUCAGON 1 MG
1 KIT INJECTION
Status: DISCONTINUED | OUTPATIENT
Start: 2025-06-27 | End: 2025-06-28 | Stop reason: HOSPADM

## 2025-06-27 RX ORDER — SUCRALFATE 1 G/10ML
2 SUSPENSION ORAL EVERY 6 HOURS
Status: DISCONTINUED | OUTPATIENT
Start: 2025-06-27 | End: 2025-06-28 | Stop reason: HOSPADM

## 2025-06-27 RX ORDER — SODIUM CHLORIDE 0.9 % (FLUSH) 0.9 %
10 SYRINGE (ML) INJECTION EVERY 12 HOURS PRN
Status: DISCONTINUED | OUTPATIENT
Start: 2025-06-27 | End: 2025-06-28 | Stop reason: HOSPADM

## 2025-06-27 RX ADMIN — LIDOCAINE HYDROCHLORIDE 15 ML: 20 SOLUTION ORAL at 10:06

## 2025-06-27 RX ADMIN — ALUMINUM HYDROXIDE, MAGNESIUM HYDROXIDE, AND DIMETHICONE 30 ML: 200; 20; 200 SUSPENSION ORAL at 10:06

## 2025-06-27 RX ADMIN — SODIUM CHLORIDE, POTASSIUM CHLORIDE, SODIUM LACTATE AND CALCIUM CHLORIDE: 600; 310; 30; 20 INJECTION, SOLUTION INTRAVENOUS at 09:06

## 2025-06-27 RX ADMIN — ZOLPIDEM TARTRATE 10 MG: 5 TABLET ORAL at 10:06

## 2025-06-27 RX ADMIN — SUCRALFATE ORAL 2 G: 1 SUSPENSION ORAL at 10:06

## 2025-06-27 RX ADMIN — PANTOPRAZOLE SODIUM 80 MG: 40 INJECTION, POWDER, FOR SOLUTION INTRAVENOUS at 04:06

## 2025-06-27 RX ADMIN — Medication 16 G: at 08:06

## 2025-06-27 RX ADMIN — FAMOTIDINE 20 MG: 10 INJECTION, SOLUTION INTRAVENOUS at 10:06

## 2025-06-27 RX ADMIN — ONDANSETRON 4 MG: 2 INJECTION INTRAMUSCULAR; INTRAVENOUS at 11:06

## 2025-06-27 NOTE — FIRST PROVIDER EVALUATION
Emergency Department TeleTriage Encounter Note      CHIEF COMPLAINT    Chief Complaint   Patient presents with    Constipation     Gastric sleeve hx.     Melena     Black stool today        VITAL SIGNS   Initial Vitals [06/27/25 1420]   BP Pulse Resp Temp SpO2   113/72 80 20 98 °F (36.7 °C) 100 %      MAP       --            ALLERGIES    Review of patient's allergies indicates:   Allergen Reactions    Imitrex [sumatriptan] Swelling     Throat swelling    Nsaids (non-steroidal anti-inflammatory drug) Other (See Comments)     NOT A TRUE ALLERGY!!   GI ulcers, told by gastroenterologist to not take NSAIDs       PROVIDER TRIAGE NOTE  Verbal consent for the teletriage evaluation was given by the patient at the start of the evaluation.  All efforts will be made to maintain patient's privacy during the evaluation.      This is a teletriage evaluation of a 39 y.o. female presenting to the ED with c/o s/p gastric sleeve 1 month ago; has been having constipation since the surgery.  Took Milk of Magnesia yesterday, had black tarry stool this am.  Surgery was at [facility]. Limited physical exam via telehealth: The patient is awake, alert, answering questions appropriately and is not in respiratory distress.  As the Teletriage provider, I performed an initial assessment and ordered appropriate labs and imaging studies, if any, to facilitate the patient's care once placed in the ED. Once a room is available, care and a full evaluation will be completed by an alternate ED provider.  Any additional orders and the final disposition will be determined by that provider.  All imaging and labs will not be followed-up by the Teletriage Team, including myself.          ORDERS  Labs Reviewed - No data to display    ED Orders (720h ago, onward)      Start Ordered     Status Ordering Provider    06/27/25 1426 06/27/25 1425  Vital signs  Every 2 hours         Ordered TWAN VILLAR    06/27/25 1425 06/27/25 1425  Diet NPO  Diet effective now          Ordered TWAN VILLAR    06/27/25 1425 06/27/25 1425  Insert peripheral IV  Once         Ordered TWAN VILLAR    06/27/25 1425 06/27/25 1425  CBC W/ AUTO DIFFERENTIAL  STAT         Ordered TWAN VILLAR    06/27/25 1425 06/27/25 1425  Comp. Metabolic Panel  STAT         Ordered TWAN VILLAR    06/27/25 1425 06/27/25 1425  Lipase  STAT         Ordered TWAN VILLAR    06/27/25 1425 06/27/25 1425  Urinalysis, Reflex to Urine Culture Urine, Clean Catch  STAT         Ordered TWAN VILLAR    Unscheduled 06/27/25 1425  POCT urine pregnancy  Once         Ordered TWAN VILLAR    Unscheduled 06/27/25 1425  Occult blood x 1, stool  Once         Ordered TWAN VILLAR GISELE              Virtual Visit Note: The provider triage portion of this emergency department evaluation and documentation was performed via [employer], a HIPAA-compliant telemedicine application, in concert with a tele-presenter in the room. A face to face patient evaluation with one of my colleagues will occur once the patient is placed in an emergency department room.      DISCLAIMER: This note was prepared with [employer] voice recognition transcription software. Garbled syntax, mangled pronouns, and other bizarre constructions may be attributed to that software system.

## 2025-06-27 NOTE — ED PROVIDER NOTES
Encounter Date: 2025       History     Chief Complaint   Patient presents with    Constipation     Gastric sleeve hx.     Melena     Black stool today      Patient is a 39 y.o. female who presents to the ED 2025 with a chief complaint of melena stool that occurred today.  Patient states she had a gastric sleeve surgery a month ago.  She has since been having some constipation.  She states yesterday she had a normal brown color stool yesterday and today she felt like her stool was dark like melena.  She also had gastritis and burning that developed today only in her epigastrium.  She denies any other abdominal pain.  She denies any fever.  Denies any nausea or vomiting.  She does not take blood thinners.  She has a history of gastric ulcers.  Other history includes anxiety, MS, and thyroid disease.             Review of patient's allergies indicates:   Allergen Reactions    Imitrex [sumatriptan] Swelling     Throat swelling    Nsaids (non-steroidal anti-inflammatory drug) Other (See Comments)     NOT A TRUE ALLERGY!!   GI ulcers, told by gastroenterologist to not take NSAIDs     Past Medical History:   Diagnosis Date    Anxiety     MS (multiple sclerosis)     Multiple sclerosis     Neuromuscular disorder     Thyroid disease      Past Surgical History:   Procedure Laterality Date     SECTION  2008     SECTION  2010     SECTION  2014    HEMORRHOIDECTOMY INVOLVING TWO OR MORE ANAL COLUMNS  2016    LIPOMA RESECTION  2009    TONSILLECTOMY       No family history on file.  Social History[1]  Review of Systems   Constitutional:  Negative for chills and fever.   Respiratory:  Negative for chest tightness and shortness of breath.    Cardiovascular:  Negative for chest pain.   Gastrointestinal:  Positive for abdominal pain, blood in stool and constipation. Negative for diarrhea, nausea and vomiting.   Genitourinary:  Negative for dysuria.   Musculoskeletal:  Negative for arthralgias and  myalgias.   Skin:  Negative for rash and wound.   Neurological:  Negative for syncope.   Hematological:  Does not bruise/bleed easily.       Physical Exam     Initial Vitals [06/27/25 1420]   BP Pulse Resp Temp SpO2   113/72 80 20 98 °F (36.7 °C) 100 %      MAP       --         Physical Exam    Nursing note and vitals reviewed.  Constitutional: Vital signs are normal. She appears well-developed and well-nourished.   HENT:   Head: Normocephalic and atraumatic.   Eyes: Pupils are equal, round, and reactive to light.   Neck: Neck supple.   Cardiovascular:  Normal rate, regular rhythm, normal heart sounds and intact distal pulses.     Exam reveals no gallop and no friction rub.       No murmur heard.  Pulmonary/Chest: Breath sounds normal. She has no wheezes. She has no rhonchi. She has no rales.   Abdominal: Abdomen is soft. Bowel sounds are normal. There is abdominal tenderness in the epigastric area.   Genitourinary:    Rectum normal.   Rectum:      Guaiac result positive.      No rectal mass, anal fissure, tenderness, external hemorrhoid, internal hemorrhoid or abnormal anal tone.   Guaiac positive stool. : Acceptable.   Genitourinary Comments: Female RN chaperone present during entire exam     Musculoskeletal:      Cervical back: Neck supple.     Neurological: She is alert and oriented to person, place, and time. She has normal strength.   Skin: Skin is warm, dry and intact.   Psychiatric: She has a normal mood and affect. Her speech is normal and behavior is normal.         ED Course   Procedures  Labs Reviewed   COMPREHENSIVE METABOLIC PANEL - Abnormal       Result Value    Sodium 139      Potassium 4.2      Chloride 104      CO2 22 (*)     Glucose 71      BUN 8      Creatinine 0.7      Calcium 9.6      Protein Total 7.6      Albumin 4.1      Bilirubin Total 0.6      ALP 77      AST 26      ALT 13      Anion Gap 13      eGFR >60     URINALYSIS, REFLEX TO URINE CULTURE - Abnormal    Color, UA  Yellow      Appearance, UA Clear      Spec Grav UA 1.015      pH, UA 6.0      Protein, UA Negative      Glucose, UA Negative      Ketones, UA 3+ (*)     Blood, UA Negative      Bilirubin, UA Negative      Urobilinogen, UA Negative      Nitrites, UA Negative      Leukocyte Esterase, UA Negative     CBC WITH DIFFERENTIAL - Abnormal    WBC 7.34      RBC 4.42      Hgb 11.9 (*)     Hct 37.7      MCV 85      MCH 26.9 (*)     MCHC 31.6 (*)     RDW 14.5      Platelet Count 287      MPV 11.3      Nucleated RBC 0      Neut % 72.3      Lymph % 18.5      Mono % 7.1      Eos % 1.2      Basophil % 0.8      Imm Grans % 0.1      Neut # 5.3      Lymph # 1.36      Mono # 0.52      Eos # 0.09      Baso # 0.06      Imm Grans # 0.01     POCT GLUCOSE - Abnormal    POCT Glucose 54 (*)    LIPASE - Normal    Lipase Level 37     CBC W/ AUTO DIFFERENTIAL    Narrative:     The following orders were created for panel order CBC W/ AUTO DIFFERENTIAL.  Procedure                               Abnormality         Status                     ---------                               -----------         ------                     CBC with Differential[[id number]]       Abnormal            Final result                 Please view results for these tests on the individual orders.   POCT URINE PREGNANCY    POC Preg Test, Ur Negative       Acceptable Yes     TYPE & SCREEN    Specimen Outdate 06/30/2025 23:59      Group & Rh O POS      Indirect Reymundo NEG            Imaging Results    None          Medications   sodium chloride 0.9% flush 10 mL (has no administration in time range)   naloxone 0.4 mg/mL injection 0.02 mg (has no administration in time range)   glucose chewable tablet 16 g (has no administration in time range)   glucose chewable tablet 24 g (has no administration in time range)   dextrose 50% injection 12.5 g (has no administration in time range)   dextrose 50% injection 25 g (has no administration in time range)   glucagon  (human recombinant) injection 1 mg (has no administration in time range)   ondansetron injection 4 mg (has no administration in time range)   prochlorperazine injection Soln 5 mg (has no administration in time range)   pantoprazole injection 40 mg (has no administration in time range)   pantoprazole injection 80 mg (80 mg Intravenous Given 6/27/25 1600)     Medical Decision Making  Risk  Prescription drug management.         APC / Resident Notes:   Patient is a 39 y.o. female who presents to the ED 06/27/2025 Who underwent emergent evaluation for dark stool that occurred today.  Epigastric tenderness only on exam.  No other abdominal tenderness or guarding or distention.  Mild improvement with PPI in the emergency department.  Lipase normal.  I do not think pancreatitis.  She is not vomiting.  I do not think bowel obstruction.  I doubt other emergent intra-abdominal process such as perforation or abscess.  Do not think further emergent CT imaging indicated at this time.  Most likely peptic ulcer disease with possible GI bleed and she is given PPI and has no further bloody stools in the emergency department.  Positive bedside Hemoccult.  Hemoglobin stable.  Vital signs normal.  She is not taking any blood thinners.  Labs are remarkable.  Do not think any emergent blood transfusion indicated at this time.  No sign of any acute infectious process requiring antibiotics at this time.  Discussed case with gastroenterologist on-call Dr. Barclay who agrees with admission will evaluate patient tomorrow.  Patient agreeable to this plan of care.  Case discussed with hospital medicine team who is accepting of this admission.  Case also discussed with Dr. Oleary who is agreeable plan of care and also evaluated patient.             ED Course as of 06/27/25 1943 Fri Jun 27, 2025   4546 EKG:  Normal sinus rhythm at a rate of 90.  Normal intervals.  Normal axis.  No significant ST or T wave changes suggesting acute ischemia or  infarction.  (Independently interpreted by me)   [MR]   1851 Spoke with GI Dr. De La Garza on call agrees with PPI and admission and NPO after MN. Pt updated and agreeable to plan of care.  [JK]      ED Course User Index  [JK] Lissy Mccollum NP  [MR] Trevor Oleary MD               Medical Decision Making:   Differential Diagnosis:   PUD  GASTRITIS  GI BLEED             Clinical Impression:  Final diagnoses:  [K92.2] GI bleed          ED Disposition Condition    Observation                     Lissy Mccollum NP  06/27/25 1943         [1]   Social History  Tobacco Use    Smoking status: Never   Substance Use Topics    Alcohol use: Never    Drug use: Never        Lissy Mccollum NP  06/27/25 2002

## 2025-06-28 ENCOUNTER — ANESTHESIA (OUTPATIENT)
Dept: ENDOSCOPY | Facility: HOSPITAL | Age: 40
End: 2025-06-28
Payer: MEDICAID

## 2025-06-28 ENCOUNTER — ANESTHESIA EVENT (OUTPATIENT)
Dept: ENDOSCOPY | Facility: HOSPITAL | Age: 40
End: 2025-06-28
Payer: MEDICAID

## 2025-06-28 VITALS
TEMPERATURE: 98 F | DIASTOLIC BLOOD PRESSURE: 81 MMHG | HEIGHT: 60 IN | WEIGHT: 183 LBS | HEART RATE: 79 BPM | SYSTOLIC BLOOD PRESSURE: 132 MMHG | OXYGEN SATURATION: 100 % | BODY MASS INDEX: 35.93 KG/M2 | RESPIRATION RATE: 16 BRPM

## 2025-06-28 PROBLEM — R10.10 UPPER ABDOMINAL PAIN: Status: ACTIVE | Noted: 2025-06-28

## 2025-06-28 PROBLEM — Z90.3 S/P GASTRIC SLEEVE PROCEDURE: Status: ACTIVE | Noted: 2025-06-28

## 2025-06-28 PROBLEM — K92.1 MELENA: Status: ACTIVE | Noted: 2025-06-28

## 2025-06-28 LAB
ANION GAP (SMH): 12 MMOL/L (ref 8–16)
BUN SERPL-MCNC: 7 MG/DL (ref 6–20)
CALCIUM SERPL-MCNC: 9 MG/DL (ref 8.7–10.5)
CHLORIDE SERPL-SCNC: 107 MMOL/L (ref 95–110)
CO2 SERPL-SCNC: 22 MMOL/L (ref 23–29)
CREAT SERPL-MCNC: 0.7 MG/DL (ref 0.5–1.4)
ERYTHROCYTE [DISTWIDTH] IN BLOOD BY AUTOMATED COUNT: 14.4 % (ref 11.5–14.5)
FERRITIN SERPL-MCNC: 45.3 NG/ML (ref 20–300)
GFR SERPLBLD CREATININE-BSD FMLA CKD-EPI: >60 ML/MIN/1.73/M2
GLUCOSE SERPL-MCNC: 78 MG/DL (ref 70–110)
HCT VFR BLD AUTO: 33.6 % (ref 37–48.5)
HGB BLD-MCNC: 10.8 GM/DL (ref 12–16)
IRON SATN MFR SERPL: 12 % (ref 20–50)
IRON SERPL-MCNC: 37 UG/DL (ref 30–160)
MCH RBC QN AUTO: 27.3 PG (ref 27–31)
MCHC RBC AUTO-ENTMCNC: 32.1 G/DL (ref 32–36)
MCV RBC AUTO: 85 FL (ref 82–98)
PLATELET # BLD AUTO: 258 K/UL (ref 150–450)
PMV BLD AUTO: 11.2 FL (ref 9.2–12.9)
POCT GLUCOSE: 68 MG/DL (ref 70–110)
POCT GLUCOSE: 70 MG/DL (ref 70–110)
POCT GLUCOSE: 77 MG/DL (ref 70–110)
POTASSIUM SERPL-SCNC: 3.6 MMOL/L (ref 3.5–5.1)
RBC # BLD AUTO: 3.95 M/UL (ref 4–5.4)
SODIUM SERPL-SCNC: 141 MMOL/L (ref 136–145)
TIBC SERPL-MCNC: 321 UG/DL (ref 250–450)
TRANSFERRIN SERPL-MCNC: 217 MG/DL (ref 200–375)
WBC # BLD AUTO: 8.07 K/UL (ref 3.9–12.7)

## 2025-06-28 PROCEDURE — 25000003 PHARM REV CODE 250

## 2025-06-28 PROCEDURE — G0378 HOSPITAL OBSERVATION PER HR: HCPCS

## 2025-06-28 PROCEDURE — 43239 EGD BIOPSY SINGLE/MULTIPLE: CPT | Performed by: INTERNAL MEDICINE

## 2025-06-28 PROCEDURE — 63600175 PHARM REV CODE 636 W HCPCS: Performed by: NURSE ANESTHETIST, CERTIFIED REGISTERED

## 2025-06-28 PROCEDURE — 36415 COLL VENOUS BLD VENIPUNCTURE: CPT

## 2025-06-28 PROCEDURE — 96376 TX/PRO/DX INJ SAME DRUG ADON: CPT

## 2025-06-28 PROCEDURE — 85027 COMPLETE CBC AUTOMATED: CPT

## 2025-06-28 PROCEDURE — 82310 ASSAY OF CALCIUM: CPT

## 2025-06-28 PROCEDURE — 96375 TX/PRO/DX INJ NEW DRUG ADDON: CPT

## 2025-06-28 PROCEDURE — 25000003 PHARM REV CODE 250: Performed by: NURSE PRACTITIONER

## 2025-06-28 PROCEDURE — 63600175 PHARM REV CODE 636 W HCPCS

## 2025-06-28 PROCEDURE — 37000009 HC ANESTHESIA EA ADD 15 MINS: Performed by: INTERNAL MEDICINE

## 2025-06-28 PROCEDURE — 27201012 HC FORCEPS, HOT/COLD, DISP: Performed by: INTERNAL MEDICINE

## 2025-06-28 PROCEDURE — 37000008 HC ANESTHESIA 1ST 15 MINUTES: Performed by: INTERNAL MEDICINE

## 2025-06-28 PROCEDURE — 25000003 PHARM REV CODE 250: Performed by: INTERNAL MEDICINE

## 2025-06-28 PROCEDURE — 82728 ASSAY OF FERRITIN: CPT | Performed by: INTERNAL MEDICINE

## 2025-06-28 PROCEDURE — 83540 ASSAY OF IRON: CPT | Performed by: INTERNAL MEDICINE

## 2025-06-28 RX ORDER — LEVOTHYROXINE SODIUM 112 UG/1
112 TABLET ORAL
Status: DISCONTINUED | OUTPATIENT
Start: 2025-06-29 | End: 2025-06-28 | Stop reason: HOSPADM

## 2025-06-28 RX ORDER — ACETAMINOPHEN 325 MG/1
650 TABLET ORAL EVERY 6 HOURS PRN
Status: DISCONTINUED | OUTPATIENT
Start: 2025-06-28 | End: 2025-06-28 | Stop reason: HOSPADM

## 2025-06-28 RX ORDER — PROPOFOL 10 MG/ML
VIAL (ML) INTRAVENOUS
Status: DISCONTINUED | OUTPATIENT
Start: 2025-06-28 | End: 2025-06-28

## 2025-06-28 RX ORDER — LIDOCAINE HYDROCHLORIDE 20 MG/ML
INJECTION INTRAVENOUS
Status: DISCONTINUED | OUTPATIENT
Start: 2025-06-28 | End: 2025-06-28

## 2025-06-28 RX ORDER — ALPRAZOLAM 0.25 MG/1
0.5 TABLET ORAL 3 TIMES DAILY PRN
Status: DISCONTINUED | OUTPATIENT
Start: 2025-06-28 | End: 2025-06-28 | Stop reason: HOSPADM

## 2025-06-28 RX ORDER — PANTOPRAZOLE SODIUM 40 MG/1
40 TABLET, DELAYED RELEASE ORAL DAILY
Qty: 30 TABLET | Refills: 0 | Status: SHIPPED | OUTPATIENT
Start: 2025-06-28 | End: 2025-09-26

## 2025-06-28 RX ORDER — SODIUM CHLORIDE 9 MG/ML
INJECTION, SOLUTION INTRAVENOUS CONTINUOUS
Status: DISCONTINUED | OUTPATIENT
Start: 2025-06-28 | End: 2025-06-28 | Stop reason: HOSPADM

## 2025-06-28 RX ORDER — TRAMADOL HYDROCHLORIDE 50 MG/1
50 TABLET, FILM COATED ORAL EVERY 6 HOURS
Status: DISCONTINUED | OUTPATIENT
Start: 2025-06-28 | End: 2025-06-28 | Stop reason: HOSPADM

## 2025-06-28 RX ADMIN — SUCRALFATE ORAL 2 G: 1 SUSPENSION ORAL at 12:06

## 2025-06-28 RX ADMIN — SODIUM CHLORIDE: 0.9 INJECTION, SOLUTION INTRAVENOUS at 11:06

## 2025-06-28 RX ADMIN — LIDOCAINE HYDROCHLORIDE 100 MG: 20 INJECTION, SOLUTION INTRAVENOUS at 11:06

## 2025-06-28 RX ADMIN — ONDANSETRON 4 MG: 2 INJECTION INTRAMUSCULAR; INTRAVENOUS at 09:06

## 2025-06-28 RX ADMIN — ACETAMINOPHEN 650 MG: 325 TABLET ORAL at 02:06

## 2025-06-28 RX ADMIN — PROCHLORPERAZINE EDISYLATE 5 MG: 5 INJECTION INTRAMUSCULAR; INTRAVENOUS at 02:06

## 2025-06-28 RX ADMIN — PROPOFOL 30 MG: 10 INJECTION, EMULSION INTRAVENOUS at 11:06

## 2025-06-28 RX ADMIN — PANTOPRAZOLE SODIUM 40 MG: 40 INJECTION, POWDER, FOR SOLUTION INTRAVENOUS at 09:06

## 2025-06-28 RX ADMIN — PROPOFOL 150 MG: 10 INJECTION, EMULSION INTRAVENOUS at 11:06

## 2025-06-28 RX ADMIN — FAMOTIDINE 20 MG: 10 INJECTION, SOLUTION INTRAVENOUS at 09:06

## 2025-06-28 NOTE — PLAN OF CARE
Problem: Adult Inpatient Plan of Care  Goal: Plan of Care Review  6/28/2025 1348 by Jolie Arvizu LPN  Outcome: Met  6/28/2025 1239 by Jolie Arvizu LPN  Outcome: Progressing  Goal: Patient-Specific Goal (Individualized)  6/28/2025 1348 by Jolie Arvizu LPN  Outcome: Met  6/28/2025 1239 by Jolie Arvizu LPN  Outcome: Progressing  Goal: Absence of Hospital-Acquired Illness or Injury  6/28/2025 1348 by Jolie Arvizu LPN  Outcome: Met  6/28/2025 1239 by Jolie Arvizu LPN  Outcome: Progressing  Goal: Optimal Comfort and Wellbeing  6/28/2025 1348 by Jolie Arvizu LPN  Outcome: Met  6/28/2025 1239 by Jolie Arvizu LPN  Outcome: Progressing  Goal: Readiness for Transition of Care  6/28/2025 1348 by Jolie Arvizu LPN  Outcome: Met  6/28/2025 1239 by Jolie Arvizu LPN  Outcome: Progressing     
  Problem: Adult Inpatient Plan of Care  Goal: Plan of Care Review  Outcome: Progressing  Goal: Patient-Specific Goal (Individualized)  Outcome: Progressing  Goal: Absence of Hospital-Acquired Illness or Injury  Outcome: Progressing  Goal: Optimal Comfort and Wellbeing  Outcome: Progressing  Goal: Readiness for Transition of Care  Outcome: Progressing     
**Cleared for discharge from CM standpoint**     will provide transportation home      06/28/25 1319   Final Note   Assessment Type Final Discharge Note   Anticipated Discharge Disposition Home   What phone number can be called within the next 1-3 days to see how you are doing after discharge? [phone]   Hospital Resources/Appts/Education Provided Provided patient/caregiver with written discharge plan information   Post-Acute Status   Discharge Delays None known at this time       
Discharge orders noted. Additional clinical references attached.    Patient's discharge instructions given by bedside RN and reviewed via this VN with patient.    Education provided on new medication, diagnosis, and follow-up appointments.    All questions answered. Teach back method used. Patient verbalized understanding.     Floor nurse notified.      06/28/25 1420   AVS Confirmation   Discharge instructions and AVS provided to and reviewed with patient and/or significant other. Yes       
EGD;  -Mild gastritis, biopsied  -S/p gastric sleeve  -No active bleeding or old blood    Recommendation:  -PPI daily x 12 weeks  -Diet as tolerated  -Will check iron studies  -From GI perspective ok to discharge home and follow up with GI as outpatient    Joyce De La Garza MD   
Report called to Jolie HENRY. Bedside RN aware of BG.  Will recheck and treat as necessary.   
Detail Level: Simple
Acne Type: Milial cysts
Prep Text (Optional): Prior to removal the treatment areas were prepped in the usual fashion.
Extraction Method: 11 blade
Render Post-Care Instructions In Note?: no
Post-Care Instructions: I reviewed with the patient in detail post-care instructions. Patient is to keep the treatment areaas dry overnight, and then apply bacitracin twice daily until healed. Patient may apply hydrogen peroxide soaks to remove any crusting. If the lesion doesn't resolve we would consider bx.
Consent was obtained and risks were reviewed including but not limited to scarring, infection, bleeding, scabbing, incomplete removal, and allergy to anesthesia.
Render Number Of Lesions Treated: yes

## 2025-06-28 NOTE — DISCHARGE INSTRUCTIONS
Our goal at [facility] is to always give you outstanding care and exceptional service. You may receive a survey from [email or web address] by mail, text or e-mail in the next 24-48 hours asking about the care you received with us. The survey should only take 5-10 minutes to complete and is very important to us.     Your feedback provides us with a way to recognize our staff who work tirelessly to provide the best care! Also, your responses help us learn how to improve when your experience was below our aspiration of excellence. We are always looking for ways to improve your stay. We WILL use your feedback to continue making improvements to help us provide the highest quality care. We keep your personal information and feedback confidential. We appreciate your time completing this survey and can't wait to hear from you!!!    We look forward to your continued care with us! Thanks so much for choosing [facility] for your healthcare needs!

## 2025-06-28 NOTE — ANESTHESIA PREPROCEDURE EVALUATION
06/28/2025  Sully Simmons is a 39 y.o., female.      Pre-op Assessment    I have reviewed the Patient Summary Reports.     I have reviewed the Nursing Notes. I have reviewed the NPO Status.   I have reviewed the Medications.     Review of Systems  Anesthesia Hx:  No problems with previous Anesthesia                Social:  Non-Smoker       Hematology/Oncology:       -- Anemia:                                  Cardiovascular:  Cardiovascular Normal                                              Pulmonary:  Pneumonia                      Renal/:  Renal/ Normal                 Hepatic/GI:        Admitted yesterday with constipation and black tarry stools.             Neurological:    Neuromuscular Disease,       Multiple Sclerosis                              Endocrine:  Endocrine Normal          Obesity / BMI > 30  Psych:  Psychiatric History anxiety               Physical Exam  General: Well nourished, Cooperative, Alert and Oriented    Airway:  Mallampati: II   Mouth Opening: Normal  TM Distance: Normal  Neck ROM: Normal ROM    Anesthesia Plan  Type of Anesthesia, risks & benefits discussed:    Anesthesia Type: Gen ETT, Gen Supraglottic Airway, Gen Natural Airway, MAC  Intra-op Monitoring Plan: Standard ASA Monitors  Post Op Pain Control Plan: multimodal analgesia  Induction:  IV  Airway Plan: Direct, Video and Fiberoptic, Post-Induction  Informed Consent: Informed consent signed with the Patient and all parties understand the risks and agree with anesthesia plan.  All questions answered.   ASA Score: 3    Ready For Surgery From Anesthesia Perspective.   .

## 2025-06-28 NOTE — TRANSFER OF CARE
Anesthesia Transfer of Care Note    Patient: Sully Simmons    Procedure(s) Performed: Procedure(s) (LRB):  EGD (ESOPHAGOGASTRODUODENOSCOPY) (N/A)    Patient location: GI    Anesthesia Type: general    Transport from OR: Transported from OR on room air with adequate spontaneous ventilation    Post pain: adequate analgesia    Post assessment: no apparent anesthetic complications    Post vital signs: stable    Level of consciousness: awake    Nausea/Vomiting: no nausea/vomiting    Complications: none    Transfer of care protocol was followed      Last vitals: Visit Vitals  /84 (BP Location: Left arm, Patient Position: Lying)   Pulse 80   Temp 36.9 °C (98.4 °F) (Skin)   Resp 16   Ht 5' (1.524 m)   Wt 83 kg (182 lb 15.7 oz)   LMP 06/22/2025   SpO2 100%   Breastfeeding No   BMI 35.74 kg/m²

## 2025-06-28 NOTE — SUBJECTIVE & OBJECTIVE
Past Medical History:   Diagnosis Date    Anxiety     MS (multiple sclerosis)     Multiple sclerosis     Neuromuscular disorder     Thyroid disease        Past Surgical History:   Procedure Laterality Date     SECTION  2008     SECTION       SECTION      HEMORRHOIDECTOMY INVOLVING TWO OR MORE ANAL COLUMNS  2016    LIPOMA RESECTION  2009    TONSILLECTOMY         Review of patient's allergies indicates:   Allergen Reactions    Imitrex [sumatriptan] Swelling     Throat swelling    Nsaids (non-steroidal anti-inflammatory drug) Other (See Comments)     NOT A TRUE ALLERGY!!   GI ulcers, told by gastroenterologist to not take NSAIDs       No current facility-administered medications on file prior to encounter.     Current Outpatient Medications on File Prior to Encounter   Medication Sig    ALPRAZolam (XANAX) 0.5 MG tablet Take 0.5 mg by mouth 3 (three) times daily as needed for Anxiety.    levothyroxine (SYNTHROID) 112 MCG tablet Take 112 mcg by mouth before breakfast.    ondansetron (ZOFRAN) 4 MG tablet Take 1 tablet (4 mg total) by mouth every 6 (six) hours as needed.    alemtuzumab (LEMTRADA) 12 mg/1.2 mL injection Inject 12 mg into the vein.    lidocaine (LIDODERM) 5 % Place 1 patch onto the skin once daily. Remove & Discard patch within 12 hours or as directed by MD (Patient not taking: Reported on 2020)    naproxen (NAPROSYN) 500 MG tablet Take 1 tablet (500 mg total) by mouth 2 (two) times daily with meals.    pantoprazole (PROTONIX) 20 MG tablet Take 2 tablets (40 mg total) by mouth once daily.    traMADoL (ULTRAM) 50 mg tablet Take 50 mg by mouth every 6 (six) hours.     Family History    None       Tobacco Use    Smoking status: Never    Smokeless tobacco: Not on file   Substance and Sexual Activity    Alcohol use: Never    Drug use: Never    Sexual activity: Yes     Partners: Male     Review of Systems   Gastrointestinal:  Positive for abdominal distention, abdominal pain,  blood in stool and nausea.   All other systems reviewed and are negative.    Objective:     Vital Signs (Most Recent):  Temp: 98.6 °F (37 °C) (06/28/25 0000)  Pulse: 86 (06/28/25 0000)  Resp: 18 (06/28/25 0000)  BP: 129/84 (06/28/25 0000)  SpO2: 100 % (06/28/25 0000) Vital Signs (24h Range):  Temp:  [98 °F (36.7 °C)-98.6 °F (37 °C)] 98.6 °F (37 °C)  Pulse:  [80-99] 86  Resp:  [17-20] 18  SpO2:  [100 %] 100 %  BP: (110-130)/(72-84) 129/84     Weight: 83 kg (182 lb 15.7 oz)  Body mass index is 35.74 kg/m².     Physical Exam  Vitals reviewed.   Constitutional:       Appearance: Normal appearance.   HENT:      Head: Normocephalic and atraumatic.      Nose: Nose normal.      Mouth/Throat:      Pharynx: Oropharynx is clear.   Eyes:      Conjunctiva/sclera: Conjunctivae normal.   Cardiovascular:      Rate and Rhythm: Normal rate and regular rhythm.      Pulses: Normal pulses.      Heart sounds: Normal heart sounds.   Pulmonary:      Effort: Pulmonary effort is normal.      Breath sounds: Normal breath sounds.   Abdominal:      General: Bowel sounds are normal. There is no distension.      Palpations: Abdomen is soft.      Tenderness: There is no abdominal tenderness. There is no guarding.   Musculoskeletal:         General: Normal range of motion.      Cervical back: Normal range of motion and neck supple.   Skin:     General: Skin is warm and dry.      Capillary Refill: Capillary refill takes less than 2 seconds.   Neurological:      General: No focal deficit present.      Mental Status: She is alert and oriented to person, place, and time. Mental status is at baseline.   Psychiatric:         Mood and Affect: Mood normal.         Behavior: Behavior normal.         Thought Content: Thought content normal.         Judgment: Judgment normal.                Significant Labs: All pertinent labs within the past 24 hours have been reviewed.  A1C:   Recent Labs   Lab 04/04/25  1146   HGBA1C 5.5     Bilirubin:   Recent Labs   Lab  06/01/25  0820 06/27/25  1751   BILITOT 0.6 0.6     BMP:   Recent Labs   Lab 06/27/25  1751   GLU 71      K 4.2      CO2 22*   BUN 8   CREATININE 0.7   CALCIUM 9.6     CBC:   Recent Labs   Lab 06/27/25  1751   WBC 7.34   HGB 11.9*   HCT 37.7        CMP:   Recent Labs   Lab 06/27/25  1751      K 4.2      CO2 22*   GLU 71   BUN 8   CREATININE 0.7   CALCIUM 9.6   PROT 7.6   ALBUMIN 4.1   BILITOT 0.6   ALKPHOS 77   AST 26   ALT 13   ANIONGAP 13       Lipase:   Recent Labs   Lab 06/27/25  1751   LIPASE 37       POCT Glucose:   Recent Labs   Lab 06/27/25  1940 06/27/25 2002 06/27/25 2059   POCTGLUCOSE 54* 63* 98       TSH:   Recent Labs   Lab 06/01/25  0820   TSH 0.05*       Urine Studies:   Recent Labs   Lab 06/27/25  1536   APPEARANCEUA Clear   SPECGRAV 1.015   PROTEINUA Negative   BILIRUBINUA Negative   UROBILINOGEN Negative   LEUKOCYTESUR Negative       Significant Imaging: I have reviewed all pertinent imaging results/findings within the past 24 hours.  None for review

## 2025-06-28 NOTE — DISCHARGE SUMMARY
[facility]  Discharge Summary      Patient Name: Sully Simmons  MRN: [id number]  SHAI: [id number]  Patient Class: OP- Observation  Admission Date: 6/27/2025  Hospital Length of Stay: 0 days  Discharge Date and Time: 6/28/2025  2:24 PM  Attending Physician: Jerri att. providers found   Discharging Provider: Pau Jennings MD  Primary Care Provider: Bety Gibbons, FNP-C    Primary Care Team: Networked reference to record PCT     HPI:   Lance Simmons is a 39 year old female with a previous medical history of anxiety, diabetes, MS, thyroid diease, stomach ulcers and laparoscopic sleeve gastrectomy (5/26/25) who presented to the ED for blood in her stool. Patient reports feeling constipated yesterday and took milk of magnesia. She was also having some upper abdominal pain/GERD. Today she had one large melena stool. She takes a multivitamin with iron but reports her stools were brown prior melena episode. CBC showed a hgb of 11.9 and hematocrit of 37.7. CMP normal, Lipase normal, urine studies negative for infection. ED spoke with Dr. De La Garza who recommended PPI BID, make NPO for possible EGD in AM. Patient admitted by hospital medicine for further evaluation and management.    Procedure(s) (LRB):  EGD (ESOPHAGOGASTRODUODENOSCOPY) (N/A)      Hospital Course:   patient admitted for further management, no further episodes of melena, hemoglobin stable, put on PPI b.i.d. empirically, GI is consulted, patient has a EGD which shows gastritis, no active bleeding.  Patient is cleared for discharge, recommended PPI once a day for 12 weeks, avoid NSAIDs.      Goals of Care Treatment Preferences:  Code Status: Full Code         Consults:   Consults (From admission, onward)          Status Ordering Provider     Inpatient consult to Gastroenterology  Once        Provider:  Joyce De La Garza MD    Completed ARLIN PAGE            Assessment & Plan  Melena  Status post recently gastric  sleeve surgery  Also has a history of H pylori infection  Hemoglobin stable  Follow up GI  Empiric PPI b.i.d.  MS (multiple sclerosis)  Chronic condition that is currently controlled    Not in acute exacerbation  S/P gastric sleeve procedure  Aware  Final Active Diagnoses:    Diagnosis Date Noted POA    PRINCIPAL PROBLEM:  Melena [K92.1] 06/28/2025 Yes    S/P gastric sleeve procedure [Z90.3] 06/28/2025 Not Applicable    MS (multiple sclerosis) [G35] 10/24/2021 Yes      Problems Resolved During this Admission:       Discharged Condition: stable    Disposition: Home or Self Care    Follow Up:   Follow-up Information       Bety Gibbons, RODOLFO. Schedule an appointment as soon as possible for a visit in 1 week(s).    Specialty: Family Medicine  Why: message sent to clinic to contact patient with appt date/time  Contact information:  [address]  [place] [id number] [phone]               Joyce De La Garza MD. Call.    Specialties: Gastroenterology, Internal Medicine  Contact information:  [address] [facility]  [address]  [place] [address] [phone]                           Patient Instructions:   No discharge procedures on file.    Significant Diagnostic Studies: Labs: CMP   Recent Labs   Lab 06/27/25  1751 06/28/25  0454    141   K 4.2 3.6    107   CO2 22* 22*   GLU 71 78   BUN 8 7   CREATININE 0.7 0.7   CALCIUM 9.6 9.0   PROT 7.6  --    ALBUMIN 4.1  --    BILITOT 0.6  --    ALKPHOS 77  --    AST 26  --    ALT 13  --    ANIONGAP 13 12    and CBC   Recent Labs   Lab 06/27/25  1751 06/28/25  0454   WBC 7.34 8.07   HGB 11.9* 10.8*   HCT 37.7 33.6*    258       Pending Diagnostic Studies:       Procedure Component Value Units Date/Time    Specimen to Pathology - Surgery [[id number]] Collected: 06/28/25 1121    Order Status: Sent Lab Status: No result     Specimen: Tissue            Medications:  Reconciled Home Medications:      Medication List        CHANGE how you take these medications       pantoprazole 40 MG tablet  Commonly known as: PROTONIX  Take 1 tablet (40 mg total) by mouth once daily.  What changed: medication strength            CONTINUE taking these medications      ALPRAZolam 0.5 MG tablet  Commonly known as: XANAX  Take 0.5 mg by mouth 3 (three) times daily as needed for Anxiety.     LEMTRADA 12 mg/1.2 mL injection  Generic drug: alemtuzumab  Inject 12 mg into the vein.     levothyroxine 112 MCG tablet  Commonly known as: SYNTHROID  Take 112 mcg by mouth before breakfast.     ondansetron 4 MG tablet  Commonly known as: ZOFRAN  Take 1 tablet (4 mg total) by mouth every 6 (six) hours as needed.     traMADoL 50 mg tablet  Commonly known as: ULTRAM  Take 50 mg by mouth every 6 (six) hours.            STOP taking these medications      LIDOcaine 5 %  Commonly known as: LIDODERM     naproxen 500 MG tablet  Commonly known as: NAPROSYN              CT Abdomen Pelvis With IV Contrast  Result Date: 6/1/2025  [facility] [place] PE PROTOCOL, [facility] [place]BDOMEN PELVIS WITH CONTRAST,  6/1/2025 02:18 PM REASON FOR STUDY:concern for pe ADDITIONAL HISTORY: None. COMPARISON:None FINDINGS: Chest: There is no filling defect in the pulmonary arteries. There is mild bibasilar atelectasis. There is no pleural effusion or pneumothorax. The heart is normal. There is no mediastinal or hilar lymphadenopathy. Abdomen: There is diffuse hepatic steatosis. The common bile duct is normal. The pancreas, spleen, bilateral kidneys are normal. There are postsurgical changes within the anterior wall of the stomach. The bowel is within normal limits. There is no retroperitoneal free air. There is trace free fluid in the cul-de-sac. No acute osseous abnormality    No pulmonary embolus. No acute intra-abdominal abnormality. Hepatic steatosis. Preliminary Report Dictated By: SIM HICKMAN MD Electronically Signed By: Carmenza Couch MD 6/1/2025 3:28 PM CDT    CT Chest w/ CTA Cardiac  Result Date: 6/1/2025  [facility] [place]GIO CHEST  PE PROTOCOL, [facility] [place] ABDOMEN PELVIS WITH CONTRAST,  6/1/2025 02:18 PM REASON FOR STUDY:concern for pe ADDITIONAL HISTORY: None. COMPARISON:None FINDINGS: Chest: There is no filling defect in the pulmonary arteries. There is mild bibasilar atelectasis. There is no pleural effusion or pneumothorax. The heart is normal. There is no mediastinal or hilar lymphadenopathy. Abdomen: There is diffuse hepatic steatosis. The common bile duct is normal. The pancreas, spleen, bilateral kidneys are normal. There are postsurgical changes within the anterior wall of the stomach. The bowel is within normal limits. There is no retroperitoneal free air. There is trace free fluid in the cul-de-sac. No acute osseous abnormality    No pulmonary embolus. No acute intra-abdominal abnormality. Hepatic steatosis. Preliminary Report Dictated By: SIM HICKMAN MD Electronically Signed By: Carmenza Couch MD 6/1/2025 3:28 PM CDT    VAS US Venous Legs Bilateral  Result Date: 6/1/2025  DIAGNOSIS: REASON FOR STUDY:concern for DVT ADDITIONAL HISTORY: None. COMPARISON: TECHNIQUE: Compression grayscale ultrasound of the right and left common femoral vein, femoral vein, great saphenous vein, popliteal vein, and proximal peroneal and posterior tibial veins (in the calf) was performed in concert with pulsed and color Doppler ultrasound. FINDINGS:   There is normal compressibility of all visualized veins. The flow characteristics are normal within all visualized veins.    No evidence of DVT in the visualized portions of the right and left lower extremity veins. Preliminary Report Dictated By: SIM HICKMAN MD Electronically Signed By: Carmenza Couch MD 6/1/2025 11:09 AM CDT    X-Ray Chest 1 View  Result Date: 6/1/2025  [facility] [place] XR CHEST 1 VW PORTABLE,  6/1/2025 08:25 AM REASON FOR STUDY:SOB ADDITIONAL HISTORY: None. COMPARISON:7/24/2023 FINDINGS: The cardiomediastinal silhouette is normal. The lungs are well aerated. There is no consolidation, pleural  effusion or pneumothorax. No acute osseous abnormality.     No acute intrathoracic abnormality. Preliminary Report Dictated By: SIM HICKMAN MD Electronically Signed By: Carmenza Couch MD 6/1/2025 8:34 AM CDT  - pulls last radiology orders    Indwelling Lines/Drains at time of discharge:   Lines/Drains/Airways       None                       Time spent on the discharge of patient: 35 minutes         Pau Jennings MD  Department of Hospital Medicine  [facility]/Surg

## 2025-06-28 NOTE — PROVATION PATIENT INSTRUCTIONS
Discharge Summary/Instructions after an Endoscopic Procedure  Patient Name: Sully Simmons  Patient MRN: [id number]  Patient YOB: 1985 Saturday, June 28, 2025  Joyce De La Garza MD  Dear patient,  As a result of recent federal legislation (The Federal Cures Act), you may   receive lab or pathology results from your procedure in your [facility]   account before your physician is able to contact you. Your physician or   their representative will relay the results to you with their   recommendations at their soonest availability.  Thank you,  RESTRICTIONS:  During your procedure today, you received medications for sedation.  These   medications may affect your judgment, balance and coordination.  Therefore,   for 24 hours, you have the following restrictions:   - DO NOT drive a car, operate machinery, make legal/financial decisions,   sign important papers or drink alcohol.    ACTIVITY:  Today: no heavy lifting, straining or running due to procedural   sedation/anesthesia.  The following day: return to full activity including work.  DIET:  Eat and drink normally unless instructed otherwise.     TREATMENT FOR COMMON SIDE EFFECTS:  - Mild abdominal pain, nausea, belching, bloating or excessive gas:  rest,   eat lightly and use a heating pad.  - Sore Throat: treat with throat lozenges and/or gargle with warm salt   water.  - Because air was used during the procedure, expelling large amounts of air   from your rectum or belching is normal.  - If a bowel prep was taken, you may not have a bowel movement for 1-3 days.    This is normal.  SYMPTOMS TO WATCH FOR AND REPORT TO YOUR PHYSICIAN:  1. Abdominal pain or bloating, other than gas cramps.  2. Chest pain.  3. Back pain.  4. Signs of infection such as: chills or fever occurring within 24 hours   after the procedure.  5. Rectal bleeding, which would show as bright red, maroon, or black stools.   (A tablespoon of blood from the rectum is not serious,  especially if   hemorrhoids are present.)  6. Vomiting.  7. Weakness or dizziness.  GO DIRECTLY TO THE NEAREST EMERGENCY ROOM IF YOU HAVE ANY OF THE FOLLOWING:      Difficulty breathing              Chills and/or fever over 101 F   Persistent vomiting and/or vomiting blood   Severe abdominal pain   Severe chest pain   Black, tarry stools   Bleeding- more than one tablespoon   Any other symptom or condition that you feel may need urgent attention  Your doctor recommends these additional instructions:  If any biopsies were taken, your doctors clinic will contact you in 1 to 2   weeks with any results.  - Await pathology results.   - [facility] to [facility].   - Advance diet as tolerated.   - Continue present medications.   -Ok to discharge home on daily PPI x 12 weeks   -Check iron studies  -Follow up in GI clinic  For questions, problems or results please call your physician - Joyce De La Garza MD at Work:  [phone].  [facility], EMERGENCY ROOM PHONE NUMBER: [phone]  IF A COMPLICATION OR EMERGENCY SITUATION ARISES AND YOU ARE UNABLE TO REACH   YOUR PHYSICIAN - GO DIRECTLY TO THE EMERGENCY ROOM.  Joyce De La Garza MD  6/28/2025 11:30:17 AM  This report has been verified and signed electronically.  Dear patient,  As a result of recent federal legislation (The Federal Cures Act), you may   receive lab or pathology results from your procedure in your [facility]   account before your physician is able to contact you. Your physician or   their representative will relay the results to you with their   recommendations at their soonest availability.  Thank you,  PROVATION

## 2025-06-28 NOTE — SUBJECTIVE & OBJECTIVE
Interval History:   Seen and examined at multidisciplinary rounds, recently has a gastric sleeve surgery at outside facility, admitted for intermittent dark stools.  Also has a history of H pylori infection- treated.  Hemoglobin stable.   Pending EGD as per gastroenterologist.     Review of Systems   Gastrointestinal:  Positive for abdominal pain and blood in stool.     Objective:     Vital Signs (Most Recent):  Temp: 98.4 °F (36.9 °C) (06/28/25 1035)  Pulse: 94 (06/28/25 1125)  Resp: 16 (06/28/25 1125)  BP: (!) 150/89 (06/28/25 1125)  SpO2: 97 % (06/28/25 1125) Vital Signs (24h Range):  Temp:  [98 °F (36.7 °C)-98.6 °F (37 °C)] 98.4 °F (36.9 °C)  Pulse:  [80-99] 94  Resp:  [16-20] 16  SpO2:  [97 %-100 %] 97 %  BP: (101-150)/(60-89) 150/89     Weight: 83 kg (182 lb 15.7 oz)  Body mass index is 35.74 kg/m².    Intake/Output Summary (Last 24 hours) at 6/28/2025 1133  Last data filed at 6/28/2025 1125  Gross per 24 hour   Intake 550 ml   Output 1050 ml   Net -500 ml         Physical Exam  Constitutional:       Appearance: Normal appearance.   HENT:      Head: Normocephalic and atraumatic.      Nose: Nose normal.   Eyes:      Extraocular Movements: Extraocular movements intact.      Pupils: Pupils are equal, round, and reactive to light.   Cardiovascular:      Rate and Rhythm: Normal rate and regular rhythm.      Heart sounds: No murmur heard.  Pulmonary:      Effort: Pulmonary effort is normal.      Breath sounds: Normal breath sounds.   Abdominal:      General: Abdomen is flat.      Palpations: Abdomen is soft.   Musculoskeletal:         General: Normal range of motion.      Cervical back: Normal range of motion and neck supple.   Skin:     General: Skin is warm and dry.   Neurological:      General: No focal deficit present.      Mental Status: She is alert and oriented to person, place, and time.               Significant Labs: All pertinent labs within the past 24 hours have been reviewed.  CBC:   Recent Labs   Lab  "06/27/25  1751 06/28/25  0454   WBC 7.34 8.07   HGB 11.9* 10.8*   HCT 37.7 33.6*    258     CMP:   Recent Labs   Lab 06/27/25  1751 06/28/25  0454    141   K 4.2 3.6    107   CO2 22* 22*   GLU 71 78   BUN 8 7   CREATININE 0.7 0.7   CALCIUM 9.6 9.0   PROT 7.6  --    ALBUMIN 4.1  --    BILITOT 0.6  --    ALKPHOS 77  --    AST 26  --    ALT 13  --    ANIONGAP 13 12     Cardiac Markers: No results for input(s): "CKMB", "MYOGLOBIN", "BNP", "TROPISTAT" in the last 48 hours.    Significant Imaging: I have reviewed all pertinent imaging results/findings within the past 24 hours.  I have reviewed and interpreted all pertinent imaging results/findings within the past 24 hours.    Scheduled Meds:   famotidine (PF)  20 mg Intravenous BID    pantoprazole  40 mg Intravenous BID    sucralfate  2 g Oral Q6H    zolpidem  10 mg Oral QHS     Continuous Infusions:   0.9% NaCl   Intravenous Continuous   Stopped at 06/28/25 1125    lactated ringers   Intravenous Continuous 75 mL/hr at 06/27/25 2154 New Bag at 06/27/25 2154     PRN Meds:.  Current Facility-Administered Medications:     acetaminophen, 650 mg, Oral, Q6H PRN    dextrose 50%, 12.5 g, Intravenous, PRN    dextrose 50%, 25 g, Intravenous, PRN    glucagon (human recombinant), 1 mg, Intramuscular, PRN    glucose, 16 g, Oral, PRN    glucose, 24 g, Oral, PRN    naloxone, 0.02 mg, Intravenous, PRN    ondansetron, 4 mg, Intravenous, Q8H PRN    prochlorperazine, 5 mg, Intravenous, Q6H PRN    sodium chloride 0.9%, 10 mL, Intravenous, Q12H PRN      CT Abdomen Pelvis With IV Contrast  Result Date: 6/1/2025  [facility] [place] ANGIO CHEST PE PROTOCOL, [facility] [place] ABDOMEN PELVIS WITH CONTRAST,  6/1/2025 02:18 PM REASON FOR STUDY:concern for pe ADDITIONAL HISTORY: None. COMPARISON:None FINDINGS: Chest: There is no filling defect in the pulmonary arteries. There is mild bibasilar atelectasis. There is no pleural effusion or pneumothorax. The heart is normal. There is no mediastinal or " hilar lymphadenopathy. Abdomen: There is diffuse hepatic steatosis. The common bile duct is normal. The pancreas, spleen, bilateral kidneys are normal. There are postsurgical changes within the anterior wall of the stomach. The bowel is within normal limits. There is no retroperitoneal free air. There is trace free fluid in the cul-de-sac. No acute osseous abnormality    No pulmonary embolus. No acute intra-abdominal abnormality. Hepatic steatosis. Preliminary Report Dictated By: SIM HICKMAN MD Electronically Signed By: Carmenza Couch MD 6/1/2025 3:28 PM CDT    CT Chest w/ CTA Cardiac  Result Date: 6/1/2025  [facility] CT ANGIO CHEST PE PROTOCOL, [facility] CT ABDOMEN PELVIS WITH CONTRAST,  6/1/2025 02:18 PM REASON FOR STUDY:concern for pe ADDITIONAL HISTORY: None. COMPARISON:None FINDINGS: Chest: There is no filling defect in the pulmonary arteries. There is mild bibasilar atelectasis. There is no pleural effusion or pneumothorax. The heart is normal. There is no mediastinal or hilar lymphadenopathy. Abdomen: There is diffuse hepatic steatosis. The common bile duct is normal. The pancreas, spleen, bilateral kidneys are normal. There are postsurgical changes within the anterior wall of the stomach. The bowel is within normal limits. There is no retroperitoneal free air. There is trace free fluid in the cul-de-sac. No acute osseous abnormality    No pulmonary embolus. No acute intra-abdominal abnormality. Hepatic steatosis. Preliminary Report Dictated By: SIM HICKMAN MD Electronically Signed By: Carmenza Couch MD 6/1/2025 3:28 PM CDT    VAS US Venous Legs Bilateral  Result Date: 6/1/2025  DIAGNOSIS: REASON FOR STUDY:concern for DVT ADDITIONAL HISTORY: None. COMPARISON: TECHNIQUE: Compression grayscale ultrasound of the right and left common femoral vein, femoral vein, great saphenous vein, popliteal vein, and proximal peroneal and posterior tibial veins (in the calf) was performed in concert with pulsed and color Doppler  ultrasound. FINDINGS:   There is normal compressibility of all visualized veins. The flow characteristics are normal within all visualized veins.    No evidence of DVT in the visualized portions of the right and left lower extremity veins. Preliminary Report Dictated By: SIM HICKMAN MD Electronically Signed By: Carmenza Couch MD 6/1/2025 11:09 AM CDT    X-Ray Chest 1 View  [place] Date: 6/1/2025  [facility] [place] PORTABLE,  6/1/2025 08:25 AM REASON FOR STUDY:SOB ADDITIONAL HISTORY: None. COMPARISON:7/24/2023 FINDINGS: The cardiomediastinal silhouette is normal. The lungs are well aerated. There is no consolidation, pleural effusion or pneumothorax. No acute osseous abnormality.     No acute intrathoracic abnormality. Preliminary Report Dictated By: SIM HICKMAN MD Electronically Signed By: Carmenza Couch MD 6/1/2025 8:34 AM CDT  - pulls last radiology orders

## 2025-06-28 NOTE — HOSPITAL COURSE
patient admitted for further management, no further episodes of melena, hemoglobin stable, put on PPI b.i.d. empirically, GI is consulted, patient has a EGD which shows gastritis, no active bleeding.  Patient is cleared for discharge, recommended PPI once a day for 12 weeks, avoid NSAIDs.

## 2025-06-28 NOTE — ASSESSMENT & PLAN NOTE
Status post recently gastric sleeve surgery  Also has a history of H pylori infection  Hemoglobin stable  Follow up GI  Empiric PPI b.i.d.

## 2025-06-28 NOTE — HPI
Lance Simmons is a 39 year old female with a previous medical history of anxiety, diabetes, MS, thyroid diease, stomach ulcers and laparoscopic sleeve gastrectomy (5/26/25) who presented to the ED for blood in her stool. Patient reports feeling constipated yesterday and took milk of magnesia. She was also having some upper abdominal pain/GERD. Today she had one large melena stool. She takes a multivitamin with iron but reports her stools were brown prior melena episode. CBC showed a hgb of 11.9 and hematocrit of 37.7. CMP normal, Lipase normal, urine studies negative for infection. ED spoke with Dr. De La Garza who recommended PPI BID, make NPO for possible EGD in AM. Patient admitted by hospital medicine for further evaluation and management.

## 2025-06-28 NOTE — CONSULTS
[facility]     CC: Epigastric pain, Melena     HPI 39 y.o. female with a previous medical history of anxiety, diabetes, MS, thyroid diease, stomach ulcers and laparoscopic sleeve gastrectomy (25) at [facility], presents with 1 day of acute, persistent, moderate epigastric discomfort described as burning, associated with 1 episode of melena yesterday. She denies previous similar symptoms, NSAID use, PeptoBismol and is not on antacid/PPI. She does take a daily MVI that contains iron but denies previous dark stools. She reports an EGD prior to/during her gastric sleeve procedure which she believes was unremarkable, however several years ago she had stomach ulcers and H.pylori infection. She has not had recurrent melena since admission.     Past Medical History:   Diagnosis Date    Anxiety     MS (multiple sclerosis)     Multiple sclerosis     Neuromuscular disorder     Thyroid disease        Past Surgical History:   Procedure Laterality Date     SECTION       SECTION       SECTION      HEMORRHOIDECTOMY INVOLVING TWO OR MORE ANAL COLUMNS  2016    LIPOMA RESECTION  2009    TONSILLECTOMY         Social History[1]    No family history on file.    Allergies and Medications reviewed     Review of Systems  General ROS: negative for - chills, fever or weight loss  Psychological ROS: negative for - hallucination, depression or suicidal ideation  Ophthalmic ROS: negative for - blurry vision, photophobia or eye pain  ENT ROS: negative for - epistaxis, sore throat or rhinorrhea  Respiratory ROS: no cough, shortness of breath, or wheezing  Cardiovascular ROS: no chest pain or dyspnea on exertion  Gastrointestinal ROS: + epigastric discomfort/burning, + melena, no hematemesis   Genito-Urinary ROS: no dysuria, trouble voiding, or hematuria  Musculoskeletal ROS: negative for - arthralgia, myalgia, weakness  Neurological ROS: no syncope or seizures; no ataxia  Dermatological ROS: negative  for pruritis, rash and jaundice    Physical Examination  /60 (BP Location: Left arm, Patient Position: Lying)   Pulse 82   Temp 98.1 °F (36.7 °C) (Oral)   Resp 16   Ht 5' (1.524 m)   Wt 83 kg (182 lb 15.7 oz)   LMP 06/22/2025   SpO2 99%   Breastfeeding No   BMI 35.74 kg/m²   General appearance: alert, cooperative, no distress  HENT: Normocephalic, atraumatic, neck symmetrical, no nasal discharge   Eyes: conjunctivae/corneas clear, PERRL, EOM's intact, sclera anicteric  Lungs: clear to auscultation bilaterally, no dullness to percussion bilaterally, symmetric expansion, breathing unlabored  Heart: regular rate and rhythm without rub; no displacement of the PMI   Abdomen: soft, mild ttp over epigastrium without rebound or guarding, BS active, no masses appreciated   Extremities: extremities symmetric; no clubbing, cyanosis, or edema  Integument: Skin color, texture, turgor normal; no rashes; hair distrubution normal, no jaundice  Neurologic: Alert and oriented X 3, no focal sensory or motor neurologic deficits  Psychiatric: no pressured speech; normal affect; no evidence of impaired cognition, no anxiety/depression     Labs:  Lab Results   Component Value Date    WBC 8.07 06/28/2025    HGB 10.8 (L) 06/28/2025    HCT 33.6 (L) 06/28/2025    MCV 85 06/28/2025     06/28/2025       CMP  Sodium   Date Value Ref Range Status   06/28/2025 141 136 - 145 mmol/L Final     Potassium   Date Value Ref Range Status   06/28/2025 3.6 3.5 - 5.1 mmol/L Final     Chloride   Date Value Ref Range Status   06/28/2025 107 95 - 110 mmol/L Final     CO2   Date Value Ref Range Status   06/28/2025 22 (L) 23 - 29 mmol/L Final     Glucose   Date Value Ref Range Status   06/28/2025 78 70 - 110 mg/dL Final     BUN   Date Value Ref Range Status   06/28/2025 7 6 - 20 mg/dL Final     Creatinine   Date Value Ref Range Status   06/28/2025 0.7 0.5 - 1.4 mg/dL Final     Calcium   Date Value Ref Range Status   06/28/2025 9.0 8.7 - 10.5  mg/dL Final     Protein Total   Date Value Ref Range Status   06/27/2025 7.6 6.0 - 8.4 gm/dL Final     Albumin   Date Value Ref Range Status   06/27/2025 4.1 3.5 - 5.2 g/dL Final     Bilirubin Total   Date Value Ref Range Status   06/27/2025 0.6 0.1 - 1.0 mg/dL Final     Comment:     For infants and newborns, interpretation of results should be based   on gestational age, weight and in agreement with clinical   observations.    Premature Infant recommended reference ranges:   0-24 hours:  <8.0 mg/dL   24-48 hours: <12.0 mg/dL   3-5 days:    <15.0 mg/dL   6-29 days:   <15.0 mg/dL     ALP   Date Value Ref Range Status   06/27/2025 77 40 - 150 unit/L Final     AST   Date Value Ref Range Status   06/27/2025 26 11 - 45 unit/L Final     ALT   Date Value Ref Range Status   06/27/2025 13 10 - 44 unit/L Final     Anion Gap   Date Value Ref Range Status   06/28/2025 12 8 - 16 mmol/L Final     eGFR   Date Value Ref Range Status   06/28/2025 >60 >60 mL/min/1.73/m2 Final   07/15/2024 >60 >60 mL/min/1.73 m^2 Final     Admission- Hgb- 11.9  -6/27/25- Hgb- 11.9    Path from Gastric sleeve 5/26/25:  -1. Stomach, sleeve gastrectomy:  - Gastric tissue without significant histopathologic findings.  - NEGATIVE for microorganisms, intestinal metaplasia, dysplasia or malignancy    Imaging:  CT abodmen/pelvis 6/1 at OSH was reviewed by me and showed There is diffuse hepatic steatosis. The common bile duct is normal.   The pancreas, spleen, bilateral kidneys are normal. There are postsurgical changes within the anterior wall of the stomach. The bowel is within normal limits. There is no retroperitoneal free air. There is trace free fluid in the cul-de-sac. No acute osseous abnormality     Assessment:   39 y.o. female with recent gastric sleeve presents with acute epigastric burning and 1 episode of melena yesterday. She is hemodynamically stable and had a mild drop in her blood counts.     Plan:  -PPI  -NPO  -EGD today    Joyce Padron  MD Fred[facility]  [facility]  [address], [address]  [place] [address]  Office: [phone]  Fax: [phone]        [1]   Social History  Tobacco Use    Smoking status: Never   Substance Use Topics    Alcohol use: Never    Drug use: Never

## 2025-06-28 NOTE — H&P
[facility]  History & Physical    Patient Name: Sully Simmons  MRN: [id number]  Patient Class: OP- Observation  Admission Date: 2025  Attending Physician: Pau Jennings MD   Primary Care Provider: Romelia Bell NP         Patient information was obtained from patient, past medical records, and ER records.     Subjective:     Principal Problem:Melena    Chief Complaint:   Chief Complaint   Patient presents with    Constipation     Gastric sleeve hx.     Melena     Black stool today         HPI: Lance Simmons is a 39 year old female with a previous medical history of anxiety, diabetes, MS, thyroid diease, stomach ulcers and laparoscopic sleeve gastrectomy (25) who presented to the ED for blood in her stool. Patient reports feeling constipated yesterday and took milk of magnesia. She was also having some upper abdominal pain/GERD. Today she had one large melena stool. She takes a multivitamin with iron but reports her stools were brown prior melena episode. CBC showed a hgb of 11.9 and hematocrit of 37.7. CMP normal, Lipase normal, urine studies negative for infection. ED spoke with Dr. De La Garza who recommended PPI BID, make NPO for possible EGD in AM. Patient admitted by hospital medicine for further evaluation and management.    Past Medical History:   Diagnosis Date    Anxiety     MS (multiple sclerosis)     Multiple sclerosis     Neuromuscular disorder     Thyroid disease        Past Surgical History:   Procedure Laterality Date     SECTION  2008     SECTION       SECTION  2014    HEMORRHOIDECTOMY INVOLVING TWO OR MORE ANAL COLUMNS  2016    LIPOMA RESECTION  2009    TONSILLECTOMY         Review of patient's allergies indicates:   Allergen Reactions    Imitrex [sumatriptan] Swelling     Throat swelling    Nsaids (non-steroidal anti-inflammatory drug) Other (See Comments)     NOT A TRUE ALLERGY!!   GI ulcers, told by  gastroenterologist to not take NSAIDs       No current facility-administered medications on file prior to encounter.     Current Outpatient Medications on File Prior to Encounter   Medication Sig    ALPRAZolam (XANAX) 0.5 MG tablet Take 0.5 mg by mouth 3 (three) times daily as needed for Anxiety.    levothyroxine (SYNTHROID) 112 MCG tablet Take 112 mcg by mouth before breakfast.    ondansetron (ZOFRAN) 4 MG tablet Take 1 tablet (4 mg total) by mouth every 6 (six) hours as needed.    alemtuzumab (LEMTRADA) 12 mg/1.2 mL injection Inject 12 mg into the vein.    lidocaine (LIDODERM) 5 % Place 1 patch onto the skin once daily. Remove & Discard patch within 12 hours or as directed by MD (Patient not taking: Reported on 11/13/2020)    naproxen (NAPROSYN) 500 MG tablet Take 1 tablet (500 mg total) by mouth 2 (two) times daily with meals.    pantoprazole (PROTONIX) 20 MG tablet Take 2 tablets (40 mg total) by mouth once daily.    traMADoL (ULTRAM) 50 mg tablet Take 50 mg by mouth every 6 (six) hours.     Family History    None       Tobacco Use    Smoking status: Never    Smokeless tobacco: Not on file   Substance and Sexual Activity    Alcohol use: Never    Drug use: Never    Sexual activity: Yes     Partners: Male     Review of Systems   Gastrointestinal:  Positive for abdominal distention, abdominal pain, blood in stool and nausea.   All other systems reviewed and are negative.    Objective:     Vital Signs (Most Recent):  Temp: 98.6 °F (37 °C) (06/28/25 0000)  Pulse: 86 (06/28/25 0000)  Resp: 18 (06/28/25 0000)  BP: 129/84 (06/28/25 0000)  SpO2: 100 % (06/28/25 0000) Vital Signs (24h Range):  Temp:  [98 °F (36.7 °C)-98.6 °F (37 °C)] 98.6 °F (37 °C)  Pulse:  [80-99] 86  Resp:  [17-20] 18  SpO2:  [100 %] 100 %  BP: (110-130)/(72-84) 129/84     Weight: 83 kg (182 lb 15.7 oz)  Body mass index is 35.74 kg/m².     Physical Exam  Vitals reviewed.   Constitutional:       Appearance: Normal appearance.   HENT:      Head:  Normocephalic and atraumatic.      Nose: Nose normal.      Mouth/Throat:      Pharynx: Oropharynx is clear.   Eyes:      Conjunctiva/sclera: Conjunctivae normal.   Cardiovascular:      Rate and Rhythm: Normal rate and regular rhythm.      Pulses: Normal pulses.      Heart sounds: Normal heart sounds.   Pulmonary:      Effort: Pulmonary effort is normal.      Breath sounds: Normal breath sounds.   Abdominal:      General: Bowel sounds are normal. There is no distension.      Palpations: Abdomen is soft.      Tenderness: There is no abdominal tenderness. There is no guarding.   Musculoskeletal:         General: Normal range of motion.      Cervical back: Normal range of motion and neck supple.   Skin:     General: Skin is warm and dry.      Capillary Refill: Capillary refill takes less than 2 seconds.   Neurological:      General: No focal deficit present.      Mental Status: She is alert and oriented to person, place, and time. Mental status is at baseline.   Psychiatric:         Mood and Affect: Mood normal.         Behavior: Behavior normal.         Thought Content: Thought content normal.         Judgment: Judgment normal.                Significant Labs: All pertinent labs within the past 24 hours have been reviewed.  A1C:   Recent Labs   Lab 04/04/25  1146   HGBA1C 5.5     Bilirubin:   Recent Labs   Lab 06/01/25  0820 06/27/25  1751   BILITOT 0.6 0.6     BMP:   Recent Labs   Lab 06/27/25  1751   GLU 71      K 4.2      CO2 22*   BUN 8   CREATININE 0.7   CALCIUM 9.6     CBC:   Recent Labs   Lab 06/27/25  1751   WBC 7.34   HGB 11.9*   HCT 37.7        CMP:   Recent Labs   Lab 06/27/25  1751      K 4.2      CO2 22*   GLU 71   BUN 8   CREATININE 0.7   CALCIUM 9.6   PROT 7.6   ALBUMIN 4.1   BILITOT 0.6   ALKPHOS 77   AST 26   ALT 13   ANIONGAP 13       Lipase:   Recent Labs   Lab 06/27/25  1751   LIPASE 37       POCT Glucose:   Recent Labs   Lab 06/27/25  1940 06/27/25 2002  06/27/25 2059   POCTGLUCOSE 54* 63* 98       TSH:   Recent Labs   Lab 06/01/25  0820   TSH 0.05*       Urine Studies:   Recent Labs   Lab 06/27/25  1536   APPEARANCEUA Clear   SPECGRAV 1.015   PROTEINUA Negative   BILIRUBINUA Negative   UROBILINOGEN Negative   LEUKOCYTESUR Negative       Significant Imaging: I have reviewed all pertinent imaging results/findings within the past 24 hours.  None for review  Assessment/Plan:     Assessment & Plan  Melena  -PPI BID  -NPO at midnight  -GI consulted and will see patient in AM  -Trend CBC    MS (multiple sclerosis)  Chronic condition that is currently controlled    Upper abdominal pain  Gastric Sleeve one month ago.    -PPI BID  -Pepcid BID  -Gi cocktail once  -Sulcralfate Q 6 hours  -Bariatric soft diet when not NPO    VTE Risk Mitigation (From admission, onward)           Ordered     Reason for No Pharmacological VTE Prophylaxis  Once        Question:  Reasons:  Answer:  Active Bleeding    06/27/25 1857     IP VTE HIGH RISK PATIENT  Once         06/27/25 1857     Place sequential compression device  Until discontinued         06/27/25 1857                                     On 06/28/2025, patient should be placed in hospital observation services under my care in collaboration with Dr. Pau Jennings MD.           Tia Joel NP  Department of Hospital Medicine  [facility]urg

## 2025-06-28 NOTE — PROGRESS NOTES
[facility]  Progress Note    Patient Name: Sully Simmons  MRN: [id number]  Patient Class: OP- Observation   Admission Date: 6/27/2025  Length of Stay: 0 days  Attending Physician: Pau Jennings MD  Primary Care Provider: Romelia Bell NP        Subjective     Principal Problem:Melena        HPI:  Lance Simmons is a 39 year old female with a previous medical history of anxiety, diabetes, MS, thyroid diease, stomach ulcers and laparoscopic sleeve gastrectomy (5/26/25) who presented to the ED for blood in her stool. Patient reports feeling constipated yesterday and took milk of magnesia. She was also having some upper abdominal pain/GERD. Today she had one large melena stool. She takes a multivitamin with iron but reports her stools were brown prior melena episode. CBC showed a hgb of 11.9 and hematocrit of 37.7. CMP normal, Lipase normal, urine studies negative for infection. ED spoke with Dr. De La Garza who recommended PPI BID, make NPO for possible EGD in AM. Patient admitted by hospital medicine for further evaluation and management.    Overview/Hospital Course:  No notes on file    Interval History:   Seen and examined at multidisciplinary rounds, recently has a gastric sleeve surgery at outside facility, admitted for intermittent dark stools.  Also has a history of H pylori infection- treated.  Hemoglobin stable.   Pending EGD as per gastroenterologist.     Review of Systems   Gastrointestinal:  Positive for abdominal pain and blood in stool.     Objective:     Vital Signs (Most Recent):  Temp: 98.4 °F (36.9 °C) (06/28/25 1035)  Pulse: 94 (06/28/25 1125)  Resp: 16 (06/28/25 1125)  BP: (!) 150/89 (06/28/25 1125)  SpO2: 97 % (06/28/25 1125) Vital Signs (24h Range):  Temp:  [98 °F (36.7 °C)-98.6 °F (37 °C)] 98.4 °F (36.9 °C)  Pulse:  [80-99] 94  Resp:  [16-20] 16  SpO2:  [97 %-100 %] 97 %  BP: (101-150)/(60-89) 150/89     Weight: 83 kg (182 lb 15.7 oz)  Body mass index is  "35.74 kg/m².    Intake/Output Summary (Last 24 hours) at 6/28/2025 1133  Last data filed at 6/28/2025 1125  Gross per 24 hour   Intake 550 ml   Output 1050 ml   Net -500 ml         Physical Exam  Constitutional:       Appearance: Normal appearance.   HENT:      Head: Normocephalic and atraumatic.      Nose: Nose normal.   Eyes:      Extraocular Movements: Extraocular movements intact.      Pupils: Pupils are equal, round, and reactive to light.   Cardiovascular:      Rate and Rhythm: Normal rate and regular rhythm.      Heart sounds: No murmur heard.  Pulmonary:      Effort: Pulmonary effort is normal.      Breath sounds: Normal breath sounds.   Abdominal:      General: Abdomen is flat.      Palpations: Abdomen is soft.   Musculoskeletal:         General: Normal range of motion.      Cervical back: Normal range of motion and neck supple.   Skin:     General: Skin is warm and dry.   Neurological:      General: No focal deficit present.      Mental Status: She is alert and oriented to person, place, and time.               Significant Labs: All pertinent labs within the past 24 hours have been reviewed.  CBC:   Recent Labs   Lab 06/27/25  1751 06/28/25  0454   WBC 7.34 8.07   HGB 11.9* 10.8*   HCT 37.7 33.6*    258     CMP:   Recent Labs   Lab 06/27/25  1751 06/28/25  0454    141   K 4.2 3.6    107   CO2 22* 22*   GLU 71 78   BUN 8 7   CREATININE 0.7 0.7   CALCIUM 9.6 9.0   PROT 7.6  --    ALBUMIN 4.1  --    BILITOT 0.6  --    ALKPHOS 77  --    AST 26  --    ALT 13  --    ANIONGAP 13 12     Cardiac Markers: No results for input(s): "CKMB", "MYOGLOBIN", "BNP", "TROPISTAT" in the last 48 hours.    Significant Imaging: I have reviewed all pertinent imaging results/findings within the past 24 hours.  I have reviewed and interpreted all pertinent imaging results/findings within the past 24 hours.    Scheduled Meds:   famotidine (PF)  20 mg Intravenous BID    pantoprazole  40 mg Intravenous BID    " sucralfate  2 g Oral Q6H    zolpidem  10 mg Oral QHS     Continuous Infusions:   0.9% NaCl   Intravenous Continuous   Stopped at 06/28/25 1125    lactated ringers   Intravenous Continuous 75 mL/hr at 06/27/25 2154 New Bag at 06/27/25 2154     PRN Meds:.  Current Facility-Administered Medications:     acetaminophen, 650 mg, Oral, Q6H PRN    dextrose 50%, 12.5 g, Intravenous, PRN    dextrose 50%, 25 g, Intravenous, PRN    glucagon (human recombinant), 1 mg, Intramuscular, PRN    glucose, 16 g, Oral, PRN    glucose, 24 g, Oral, PRN    naloxone, 0.02 mg, Intravenous, PRN    ondansetron, 4 mg, Intravenous, Q8H PRN    prochlorperazine, 5 mg, Intravenous, Q6H PRN    sodium chloride 0.9%, 10 mL, Intravenous, Q12H PRN      CT Abdomen Pelvis With IV Contrast  Result Date: 6/1/2025  [facility] CT ANGIO CHEST PE PROTOCOL, [facility] CT ABDOMEN PELVIS WITH CONTRAST,  6/1/2025 02:18 PM REASON FOR STUDY:concern for pe ADDITIONAL HISTORY: None. COMPARISON:None FINDINGS: Chest: There is no filling defect in the pulmonary arteries. There is mild bibasilar atelectasis. There is no pleural effusion or pneumothorax. The heart is normal. There is no mediastinal or hilar lymphadenopathy. Abdomen: There is diffuse hepatic steatosis. The common bile duct is normal. The pancreas, spleen, bilateral kidneys are normal. There are postsurgical changes within the anterior wall of the stomach. The bowel is within normal limits. There is no retroperitoneal free air. There is trace free fluid in the cul-de-sac. No acute osseous abnormality    No pulmonary embolus. No acute intra-abdominal abnormality. Hepatic steatosis. Preliminary Report Dictated By: SIM HICKMAN MD Electronically Signed By: Carmenza Couch MD 6/1/2025 3:28 PM CDT    CT Chest w/ CTA Cardiac  Result Date: 6/1/2025  [facility] CT ANGIO CHEST PE PROTOCOL, [facility] CT ABDOMEN PELVIS WITH CONTRAST,  6/1/2025 02:18 PM REASON FOR STUDY:concern for pe ADDITIONAL HISTORY: None. COMPARISON:None FINDINGS: Chest:  There is no filling defect in the pulmonary arteries. There is mild bibasilar atelectasis. There is no pleural effusion or pneumothorax. The heart is normal. There is no mediastinal or hilar lymphadenopathy. Abdomen: There is diffuse hepatic steatosis. The common bile duct is normal. The pancreas, spleen, bilateral kidneys are normal. There are postsurgical changes within the anterior wall of the stomach. The bowel is within normal limits. There is no retroperitoneal free air. There is trace free fluid in the cul-de-sac. No acute osseous abnormality    No pulmonary embolus. No acute intra-abdominal abnormality. Hepatic steatosis. Preliminary Report Dictated By: SIM HICKMAN MD Electronically Signed By: Carmenza Couch MD 6/1/2025 3:28 PM CDT    VAS US Venous Legs Bilateral  Result Date: 6/1/2025  DIAGNOSIS: REASON FOR STUDY:concern for DVT ADDITIONAL HISTORY: None. COMPARISON: TECHNIQUE: Compression grayscale ultrasound of the right and left common femoral vein, femoral vein, great saphenous vein, popliteal vein, and proximal peroneal and posterior tibial veins (in the calf) was performed in concert with pulsed and color Doppler ultrasound. FINDINGS:   There is normal compressibility of all visualized veins. The flow characteristics are normal within all visualized veins.    No evidence of DVT in the visualized portions of the right and left lower extremity veins. Preliminary Report Dictated By: SIM HICKMAN MD Electronically Signed By: Carmenza Couch MD 6/1/2025 11:09 AM CDT    X-Ray Chest 1 View  Result Date: 6/1/2025  [facility] XR CHEST 1 VW PORTABLE,  6/1/2025 08:25 AM REASON FOR STUDY:SOB ADDITIONAL HISTORY: None. COMPARISON:7/24/2023 FINDINGS: The cardiomediastinal silhouette is normal. The lungs are well aerated. There is no consolidation, pleural effusion or pneumothorax. No acute osseous abnormality.     No acute intrathoracic abnormality. Preliminary Report Dictated By: SIM HICKMAN MD Electronically  Signed By: Carmenza Couch MD 6/1/2025 8:34 AM CDT  - pulls last radiology orders        Assessment & Plan  Melena  Status post recently gastric sleeve surgery  Also has a history of H pylori infection  Hemoglobin stable  Follow up GI  Empiric PPI b.i.d.  MS (multiple sclerosis)  Chronic condition that is currently controlled    Not in acute exacerbation  S/P gastric sleeve procedure  Aware  VTE Risk Mitigation (From admission, onward)           Ordered     Reason for No Pharmacological VTE Prophylaxis  Once        Question:  Reasons:  Answer:  Active Bleeding    06/27/25 1857     IP VTE HIGH RISK PATIENT  Once         06/27/25 1857     Place sequential compression device  Until discontinued         06/27/25 1857                    Discharge Planning   SNEHAL:      Code Status: Full Code   Medical Readiness for Discharge Date:                            Pau Jennings MD  Department of Hospital Medicine   [facility] - Endoscopy

## 2025-06-28 NOTE — ED NOTES
Pt sitting up in bed awake and alert sipping on sweet tea. Pt states she is unable to eat anything r/t recent surgery. Pt's daughter at bedside. Instructed pt we would recheck cbg in 30 minutes.

## 2025-06-28 NOTE — ASSESSMENT & PLAN NOTE
Gastric Sleeve one month ago.    -PPI BID  -Pepcid BID  -Gi cocktail once  -Sulcralfate Q 6 hours  -Bariatric soft diet when not NPO

## 2025-06-29 NOTE — ANESTHESIA POSTPROCEDURE EVALUATION
Anesthesia Post Evaluation    Patient: Sully Simmons    Procedure(s) Performed: Procedure(s) (LRB):  EGD (ESOPHAGOGASTRODUODENOSCOPY) (N/A)    Final Anesthesia Type: general      Patient location during evaluation: PACU  Patient participation: Yes- Able to Participate  Level of consciousness: awake and alert and oriented  Post-procedure vital signs: reviewed and stable  Pain management: adequate  Airway patency: patent    PONV status at discharge: No PONV  Anesthetic complications: no      Cardiovascular status: blood pressure returned to baseline and stable  Respiratory status: unassisted and spontaneous ventilation  Hydration status: euvolemic  Follow-up not needed.              Vitals Value Taken Time   /81 06/28/25 12:09   Temp 36.7 °C (98 °F) 06/28/25 12:09   Pulse 79 06/28/25 12:09   Resp 16 06/28/25 12:09   SpO2 100 % 06/28/25 12:09         Event Time   Out of Recovery 06/28/2025 11:38:24         Pain/Maurice Score: Pain Rating Prior to Med Admin: 3 (6/28/2025  2:18 AM)  Pain Rating Post Med Admin: 2 (6/28/2025  3:18 AM)  Maurice Score: 9 (6/28/2025 11:25 AM)

## 2025-06-30 LAB
OHS QRS DURATION: 64 MS
OHS QTC CALCULATION: 435 MS

## 2025-07-02 ENCOUNTER — RESULTS FOLLOW-UP (OUTPATIENT)
Dept: GASTROENTEROLOGY | Facility: HOSPITAL | Age: 40
End: 2025-07-02